# Patient Record
Sex: MALE | Race: WHITE | Employment: FULL TIME | URBAN - METROPOLITAN AREA
[De-identification: names, ages, dates, MRNs, and addresses within clinical notes are randomized per-mention and may not be internally consistent; named-entity substitution may affect disease eponyms.]

---

## 2021-08-20 ENCOUNTER — OFFICE VISIT (OUTPATIENT)
Dept: ORTHOPEDIC SURGERY | Age: 46
End: 2021-08-20
Payer: COMMERCIAL

## 2021-08-20 VITALS
DIASTOLIC BLOOD PRESSURE: 87 MMHG | BODY MASS INDEX: 30.54 KG/M2 | WEIGHT: 238 LBS | HEART RATE: 59 BPM | RESPIRATION RATE: 12 BRPM | SYSTOLIC BLOOD PRESSURE: 128 MMHG | HEIGHT: 74 IN

## 2021-08-20 DIAGNOSIS — M76.51 PATELLAR TENDINITIS OF RIGHT KNEE: ICD-10-CM

## 2021-08-20 DIAGNOSIS — M25.561 ACUTE PAIN OF RIGHT KNEE: Primary | ICD-10-CM

## 2021-08-20 PROCEDURE — 99203 OFFICE O/P NEW LOW 30 MIN: CPT | Performed by: ORTHOPAEDIC SURGERY

## 2021-08-20 RX ORDER — LISINOPRIL 10 MG/1
10 TABLET ORAL DAILY
COMMUNITY
Start: 2021-08-07

## 2021-08-20 NOTE — PROGRESS NOTES
Trent Gasca is seen today for evaluation of ongoing right knee pain. His pain started back in April. Pain occurred during a run. He has very intense at the inferior pole of the right patella. It is not getting any better. Bearing weight on the single right leg such as getting out of a car or ascending or descending stairs is particularly painful. He cannot kneel at all because of pain. Ibuprofen and ice are not helpful. Stretching has not been helpful. Feels weak. He can no longer run. He has high blood pressure but is otherwise quite healthy. 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 History: Patient's relevant past family, medical, and social history are reviewed as part of today's visit. ROS of pertinent positives and negatives as above; otherwise negative. General Exam:    Vitals: Blood pressure 128/87, pulse 59, resp. rate 12, height 6' 1.5\" (1.867 m), weight 238 lb (108 kg). Constitutional: Patient is adequately groomed with no evidence of malnutrition  Mental Status: The patient is oriented to time, place and person. The patient's mood and affect are appropriate. Gait:  Patient walks with normal gait and station. Lymphatic: The lymphatic examination bilaterally reveals all areas to be without enlargement or induration. Vascular: Examination reveals no swelling or calf tenderness. Peripheral pulses are palpable and 2+. Neurological: The patient has good coordination. There is no weakness or sensory deficit. Skin:    Head/Neck: inspection reveals no rashes, ulcerations or lesions. Trunk:  inspection reveals no rashes, ulcerations or lesions. Right Lower Extremity: inspection reveals no rashes, ulcerations or lesions. Left Lower Extremity: inspection reveals no rashes, ulcerations or lesions. Examination of the bilateral hips reveals normal flexion and extension. There is no restriction in rotation.   There is no tenderness to palpation anteriorly posteriorly or laterally. Left knee examination demonstrates no effusion. There is no tenderness to palpation over the medial or lateral joint line. There is no discomfort over the patellar tendon. There is no palpable popliteal cyst.  Sensation is intact. Range of motion is normal.  There is no patellofemoral crepitation. There is no instability to varus or valgus stress applied at 0, 30, 60, or 90Â° of flexion. There is no anterior or posterior drawer. Lachman examination is normal.      Right knee has intense discomfort at the inferior pole the patella along the patella tendon. He has pain with straight leg raise. He has a small effusion. He also has medial joint line tenderness. He has full range of motion with normal ligamentous stability. Calf is soft. X-rays were obtained today in the office and interpreted by me. AP standing, PA flexed, and merchant views of the bilateral knees as well as a lateral of the right knee. These demonstrate: Bony abnormalities. There is trace spurring at the inferior patella      Assessment: Concern for partial patella tendon rupture versus anterior horn medial meniscus tear    Plan: MRI scan right knee. Follow-up with me after the scan            This note was created using voice recognition software. It has been proofread, but occasionally errors remain. Please disregard these errors. They will be corrected as they are noted.

## 2021-08-24 ENCOUNTER — OFFICE VISIT (OUTPATIENT)
Dept: ORTHOPEDIC SURGERY | Age: 46
End: 2021-08-24
Payer: COMMERCIAL

## 2021-08-24 VITALS
HEART RATE: 52 BPM | HEIGHT: 74 IN | BODY MASS INDEX: 30.54 KG/M2 | DIASTOLIC BLOOD PRESSURE: 88 MMHG | WEIGHT: 238 LBS | SYSTOLIC BLOOD PRESSURE: 125 MMHG

## 2021-08-24 DIAGNOSIS — M76.51 PATELLAR TENDINITIS OF RIGHT KNEE: ICD-10-CM

## 2021-08-24 DIAGNOSIS — M25.561 ACUTE PAIN OF RIGHT KNEE: Primary | ICD-10-CM

## 2021-08-24 PROCEDURE — 99214 OFFICE O/P EST MOD 30 MIN: CPT | Performed by: ORTHOPAEDIC SURGERY

## 2021-08-24 NOTE — PROGRESS NOTES
Kesha Vazquez returns today to follow-up his right knee. Pain at rest is still 2 out of 10. We obtained his MRI. General Exam:    Vitals: Blood pressure 125/88, pulse 52, height 6' 1.5\" (1.867 m), weight 238 lb (108 kg). Constitutional: Patient is adequately groomed with no evidence of malnutrition  Mental Status: The patient is oriented to time, place and person. The patient's mood and affect are appropriate. Right knee has intense tenderness at the inferior pole the patella and pain with deep knee flexion. Right knee MRI is reviewed. It demonstrates:     FINDINGS:  Mild swelling of the medial parapatellar plica and swelling or blistering and broad    foci of class 3 of the medial patellar facet.  Central chondromalacia.  No patellofemoral    dysplasia.       The dominant finding consists of an inferior patellar tubercle spur with delamination of the    inferior prepatellar plate and focal hypertrophic coalescent tendinopathy compatible with overt    jumper's knee.  Distal patellar tendon normal.  Quadriceps tendon normal.       ACL normal.  PCL normal.       Collaterals normal.       Corners normal.       Flexors normal.       Popliteal fossa normal.  No soft tissue or skeletal neoplastic masses identified.       Menisci intact.       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  Reactive plical    thickening.  Overt moderate severity hypertrophic jumper's knee. I reviewed these findings on the report and the images with the patient. 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 Plan: We reviewed the risks, benefits, and alternatives to surgery. The alternatives include conservative management including medications, injections, and physical therapy as well as observation. Risks of surgery include but are not limited to persistent pain, instability, and reinjury.   Risks also include risk of infection which could result in

## 2021-10-20 ENCOUNTER — TELEPHONE (OUTPATIENT)
Dept: ORTHOPEDIC SURGERY | Age: 46
End: 2021-10-20

## 2021-10-20 NOTE — TELEPHONE ENCOUNTER
CPT: [id number]  BODY PART: right knee  AUTHORIZATION: NPR    Submitted online with [facility] Case ID# [id number] was submitted to Colby Everett on 10- by Zuleima Alcala    Case pending 10/25/21    Case pending 10/29/21    Per [facility], [facility] 11/1/21

## 2021-10-29 ENCOUNTER — OFFICE VISIT (OUTPATIENT)
Dept: ORTHOPEDIC SURGERY | Age: 46
End: 2021-10-29
Payer: COMMERCIAL

## 2021-10-29 VITALS
DIASTOLIC BLOOD PRESSURE: 88 MMHG | SYSTOLIC BLOOD PRESSURE: 122 MMHG | WEIGHT: 232 LBS | BODY MASS INDEX: 29.77 KG/M2 | HEART RATE: 58 BPM | HEIGHT: 74 IN

## 2021-10-29 DIAGNOSIS — M25.561 ACUTE PAIN OF RIGHT KNEE: Primary | ICD-10-CM

## 2021-10-29 DIAGNOSIS — M76.51 PATELLAR TENDINITIS OF RIGHT KNEE: ICD-10-CM

## 2021-10-29 PROCEDURE — 99214 OFFICE O/P EST MOD 30 MIN: CPT | Performed by: ORTHOPAEDIC SURGERY

## 2021-10-29 PROCEDURE — E0114 CRUTCH UNDERARM PAIR NO WOOD: HCPCS | Performed by: ORTHOPAEDIC SURGERY

## 2021-10-29 NOTE — PROGRESS NOTES
Jennifer Garvey returns today for his right knee. He still gets moderate pain. Is only 2 out of 10 at rest but stairs, climbing, and squatting are particularly troublesome. General Exam:    Vitals: Blood pressure 122/88, pulse 58, height 6' 1.5\" (1.867 m), weight 232 lb (105.2 kg). Constitutional: Patient is adequately groomed with no evidence of malnutrition  Mental Status: The patient is oriented to time, place and person. The patient's mood and affect are appropriate. Right knee today has tenderness to palpation over the anterior knee. Since proximal pole the patella. He has no crepitation or intra-articular effusion and no joint line pain. MRI right knee and x-rays of the right knee are again reviewed. MRI scan demonstrates:    FINDINGS:  Mild swelling of the medial parapatellar plica and swelling or blistering and broad    foci of class 3 of the medial patellar facet.  Central chondromalacia.  No patellofemoral    dysplasia.       The dominant finding consists of an inferior patellar tubercle spur with delamination of the    inferior prepatellar plate and focal hypertrophic coalescent tendinopathy compatible with overt    jumper's knee.  Distal patellar tendon normal.  Quadriceps tendon normal.       ACL normal.  PCL normal.       Collaterals normal.       Corners normal.       Flexors normal.       Popliteal fossa normal.  No soft tissue or skeletal neoplastic masses identified.       Menisci intact.       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  Reactive plical    thickening.  Overt moderate severity hypertrophic jumper's knee. His x-rays show spurring of the inferior patella. I reviewed these findings again with the patient and his wife. Assessment: Right knee patella tendinitis with inferior patellar spur. Plan: Right knee arthroscopy followed by open patella tendon debridement and resection of spur.     We reviewed the risks, benefits, and alternatives to surgery. The alternatives include conservative management including medications, injections, and physical therapy as well as observation. Risks of surgery include but are not limited to persistent pain, instability, and reinjury. Risks also include risk of infection which could result in the need for further surgery and long-term use of antibiotics. Risks also include deep venous thromboses and pulmonary emboli. Risks also include problems with anesthesia including but not limited to cardiovascular compromise , stroke,  and death. The patient understands that the goal of surgery is to improve pain and function but that can never be guaranteed. We discussed full recovery but in the order of about 3 months. All of his questions have been answered at length. He understands the risk of surgery. He will get preoperative clearance and have a negative Covid test prior to surgery. All questions have been answered. Medical decision is moderate.

## 2021-11-08 ENCOUNTER — TELEPHONE (OUTPATIENT)
Dept: ORTHOPEDIC SURGERY | Age: 46
End: 2021-11-08

## 2021-11-09 ENCOUNTER — ANESTHESIA EVENT (OUTPATIENT)
Dept: OPERATING ROOM | Age: 46
End: 2021-11-09
Payer: COMMERCIAL

## 2021-11-09 DIAGNOSIS — M76.51 PATELLAR TENDINITIS OF RIGHT KNEE: ICD-10-CM

## 2021-11-09 DIAGNOSIS — M25.561 ACUTE PAIN OF RIGHT KNEE: Primary | ICD-10-CM

## 2021-11-09 RX ORDER — DOCUSATE SODIUM 100 MG/1
100 CAPSULE, LIQUID FILLED ORAL 2 TIMES DAILY
Qty: 60 CAPSULE | Refills: 0 | Status: SHIPPED | OUTPATIENT
Start: 2021-11-10 | End: 2022-01-11

## 2021-11-09 RX ORDER — PROMETHAZINE HYDROCHLORIDE 25 MG/1
25 TABLET ORAL EVERY 6 HOURS PRN
Qty: 30 TABLET | Refills: 0 | Status: SHIPPED | OUTPATIENT
Start: 2021-11-10 | End: 2021-11-16

## 2021-11-09 RX ORDER — OXYCODONE HYDROCHLORIDE AND ACETAMINOPHEN 5; 325 MG/1; MG/1
1 TABLET ORAL EVERY 6 HOURS PRN
Qty: 20 TABLET | Refills: 0 | Status: SHIPPED | OUTPATIENT
Start: 2021-11-10 | End: 2021-11-15

## 2021-11-09 NOTE — PROGRESS NOTES
C-Difficile admission screening and protocol:     * Admitted with diarrhea? YES____    NO___X__     *Prior history of C-Diff. In last 3 months? YES____   NO__X___     *Antibiotic use in the past 6-8 weeks? NO___X___YES______                 If yes which  ANTIBIOTIC AND REASON______     *Prior hospitalization or nursing home in the last month?  YES____   NO_X__ Scribe Attestation (For Scribes USE Only)... Scribe Attestation (For Scribes USE Only).../I have personally evaluated and examined the patient. The Attending was available to me as a supervising provider if needed.

## 2021-11-09 NOTE — PROGRESS NOTES
[address] [facility] time____0920________        Surgery time___1120_________    Take the following medications with a sip of water: Follow your MD/Surgeons pre-procedure instructions regarding your medications    Do not eat or drink anything after 12:00 midnight prior to your surgery. This includes water chewing gum, mints and ice chips. You may brush your teeth and gargle the morning of your surgery, but do not swallow the water     Please see your family doctor/pediatrician for a history and physical and/or concerning medications. Bring any test results/reports from your physicians office. If you are under the care of a heart doctor or specialist doctor, please be aware that you may be asked to them for clearance    You may be asked to stop blood thinners such as Coumadin, Plavix, Fragmin, Lovenox, etc., or any anti-inflammatories such as:  Aspirin, Ibuprofen, Advil, Naproxen prior to your surgery. We also ask that you stop any OTC medications such as fish oil, vitamin E, glucosamine, garlic, Multivitamins, COQ 10, etc.    We ask that you do not smoke 24 hours prior to surgery  We ask that you do not  drink any alcoholic beverages 24 hours prior to surgery     You must make arrangements for a responsible adult to take you home after your surgery. For your safety you will not be allowed to leave alone or drive yourself home. Your surgery will be cancelled if you do not have a ride home. Also for your safety, it is strongly suggested that someone stay with you the first 24 hours after your surgery. A parent or legal guardian must accompany a child scheduled for surgery and plan to stay at the hospital until the child is discharged. Please do not bring other children with you. For your comfort, please wear simple loose fitting clothing to the hospital.  Please do not bring valuables.     Do not wear any make-up or nail polish on your fingers or toes      For your safety, please do not wear any jewelry or body piercing's on the day of surgery. All jewelry must be removed. If you have dentures, they will be removed before going to operating room. For your convenience, we will provide you with a container. If you wear contact lenses or glasses, they will be removed, please bring a case for them. If you have a living will and a durable power of  for healthcare, please bring in a copy. As part of our patient safety program to minimize surgical site infections, we ask you to do the following:    · Please notify your surgeon if you develop any illness between         now and the  day of your surgery. · This includes a cough, cold, fever, sore throat, nausea,         or vomiting, and diarrhea, etc.  ·  Please notify your surgeon if you experience dizziness, shortness         of breath or blurred vision between now and the time of your surgery. Do not shave your operative site 96 hours prior to surgery. For face and neck surgery, men may use an electric razor 48 hours   prior to surgery. You may shower the night before surgery or the morning of   your surgery with an antibacterial soap. You will need to bring a photo ID and insurance card    [facility] has an onsite pharmacy, would you like to utilize our pharmacy     If you will be staying overnight and use a C-pap machine, please bring   your C-pap to hospital     Our goal is to provide you with excellent care, therefore, visitors will be limited to two(2) in the room at a time so that we may focus on providing this care for you. Please contact pre-admission testing if you have any further questions. [facility] phone number:  [phone] PAT fax number:  [phone]  Please note these are generalized instructions for all surgical cases, you may be provided with more specific instructions according to your surgery.

## 2021-11-09 NOTE — PROGRESS NOTES
Phone message left to call PAT dept at [phone]  for history review and surgery instructions on 11/9/21 @ 4906 to both patient and emergency contact numbers

## 2021-11-10 ENCOUNTER — ANESTHESIA (OUTPATIENT)
Dept: OPERATING ROOM | Age: 46
End: 2021-11-10
Payer: COMMERCIAL

## 2021-11-10 ENCOUNTER — HOSPITAL ENCOUNTER (OUTPATIENT)
Age: 46
Setting detail: OUTPATIENT SURGERY
Discharge: HOME | End: 2021-11-10
Attending: ORTHOPAEDIC SURGERY | Admitting: ORTHOPAEDIC SURGERY
Payer: COMMERCIAL

## 2021-11-10 VITALS
TEMPERATURE: 97 F | DIASTOLIC BLOOD PRESSURE: 83 MMHG | BODY MASS INDEX: 28.82 KG/M2 | OXYGEN SATURATION: 98 % | HEART RATE: 62 BPM | HEIGHT: 74 IN | SYSTOLIC BLOOD PRESSURE: 135 MMHG | RESPIRATION RATE: 16 BRPM | WEIGHT: 224.54 LBS

## 2021-11-10 VITALS
DIASTOLIC BLOOD PRESSURE: 74 MMHG | RESPIRATION RATE: 4 BRPM | SYSTOLIC BLOOD PRESSURE: 159 MMHG | OXYGEN SATURATION: 95 % | TEMPERATURE: 97.2 F

## 2021-11-10 PROCEDURE — 6360000002 HC RX W HCPCS: Performed by: ANESTHESIOLOGY

## 2021-11-10 PROCEDURE — 7100000001 HC PACU RECOVERY - ADDTL 15 MIN: Performed by: ORTHOPAEDIC SURGERY

## 2021-11-10 PROCEDURE — 3700000001 HC ADD 15 MINUTES (ANESTHESIA): Performed by: ORTHOPAEDIC SURGERY

## 2021-11-10 PROCEDURE — 7100000000 HC PACU RECOVERY - FIRST 15 MIN: Performed by: ORTHOPAEDIC SURGERY

## 2021-11-10 PROCEDURE — 7100000011 HC PHASE II RECOVERY - ADDTL 15 MIN: Performed by: ORTHOPAEDIC SURGERY

## 2021-11-10 PROCEDURE — 6360000002 HC RX W HCPCS: Performed by: NURSE ANESTHETIST, CERTIFIED REGISTERED

## 2021-11-10 PROCEDURE — 3700000000 HC ANESTHESIA ATTENDED CARE: Performed by: ORTHOPAEDIC SURGERY

## 2021-11-10 PROCEDURE — 2500000003 HC RX 250 WO HCPCS: Performed by: NURSE ANESTHETIST, CERTIFIED REGISTERED

## 2021-11-10 PROCEDURE — 3600000014 HC SURGERY LEVEL 4 ADDTL 15MIN: Performed by: ORTHOPAEDIC SURGERY

## 2021-11-10 PROCEDURE — 2580000003 HC RX 258: Performed by: ORTHOPAEDIC SURGERY

## 2021-11-10 PROCEDURE — 3600000004 HC SURGERY LEVEL 4 BASE: Performed by: ORTHOPAEDIC SURGERY

## 2021-11-10 PROCEDURE — 2720000010 HC SURG SUPPLY STERILE: Performed by: ORTHOPAEDIC SURGERY

## 2021-11-10 PROCEDURE — L1830 KO IMMOB CANVAS LONG PRE OTS: HCPCS | Performed by: ORTHOPAEDIC SURGERY

## 2021-11-10 PROCEDURE — 64447 NJX AA&/STRD FEMORAL NRV IMG: CPT | Performed by: ANESTHESIOLOGY

## 2021-11-10 PROCEDURE — 2709999900 HC NON-CHARGEABLE SUPPLY: Performed by: ORTHOPAEDIC SURGERY

## 2021-11-10 PROCEDURE — 2500000003 HC RX 250 WO HCPCS: Performed by: ORTHOPAEDIC SURGERY

## 2021-11-10 PROCEDURE — 2580000003 HC RX 258: Performed by: ANESTHESIOLOGY

## 2021-11-10 PROCEDURE — 7100000010 HC PHASE II RECOVERY - FIRST 15 MIN: Performed by: ORTHOPAEDIC SURGERY

## 2021-11-10 PROCEDURE — 6360000002 HC RX W HCPCS: Performed by: ORTHOPAEDIC SURGERY

## 2021-11-10 RX ORDER — BUPIVACAINE HYDROCHLORIDE AND EPINEPHRINE 5; 5 MG/ML; UG/ML
INJECTION, SOLUTION EPIDURAL; INTRACAUDAL; PERINEURAL
Status: COMPLETED | OUTPATIENT
Start: 2021-11-10 | End: 2021-11-10

## 2021-11-10 RX ORDER — SUCCINYLCHOLINE/SOD CL,ISO/PF 200MG/10ML
SYRINGE (ML) INTRAVENOUS PRN
Status: DISCONTINUED | OUTPATIENT
Start: 2021-11-10 | End: 2021-11-10 | Stop reason: SDUPTHER

## 2021-11-10 RX ORDER — FENTANYL CITRATE 50 UG/ML
INJECTION, SOLUTION INTRAMUSCULAR; INTRAVENOUS PRN
Status: DISCONTINUED | OUTPATIENT
Start: 2021-11-10 | End: 2021-11-10 | Stop reason: SDUPTHER

## 2021-11-10 RX ORDER — LIDOCAINE HYDROCHLORIDE 20 MG/ML
INJECTION, SOLUTION EPIDURAL; INFILTRATION; INTRACAUDAL; PERINEURAL PRN
Status: DISCONTINUED | OUTPATIENT
Start: 2021-11-10 | End: 2021-11-10 | Stop reason: SDUPTHER

## 2021-11-10 RX ORDER — SODIUM CHLORIDE 9 MG/ML
25 INJECTION, SOLUTION INTRAVENOUS PRN
Status: DISCONTINUED | OUTPATIENT
Start: 2021-11-10 | End: 2021-11-10 | Stop reason: HOSPADM

## 2021-11-10 RX ORDER — DEXAMETHASONE SODIUM PHOSPHATE 4 MG/ML
INJECTION, SOLUTION INTRA-ARTICULAR; INTRALESIONAL; INTRAMUSCULAR; INTRAVENOUS; SOFT TISSUE PRN
Status: DISCONTINUED | OUTPATIENT
Start: 2021-11-10 | End: 2021-11-10 | Stop reason: SDUPTHER

## 2021-11-10 RX ORDER — MAGNESIUM HYDROXIDE 1200 MG/15ML
LIQUID ORAL CONTINUOUS PRN
Status: COMPLETED | OUTPATIENT
Start: 2021-11-10 | End: 2021-11-10

## 2021-11-10 RX ORDER — MIDAZOLAM HYDROCHLORIDE 1 MG/ML
INJECTION INTRAMUSCULAR; INTRAVENOUS PRN
Status: DISCONTINUED | OUTPATIENT
Start: 2021-11-10 | End: 2021-11-10 | Stop reason: SDUPTHER

## 2021-11-10 RX ORDER — ROCURONIUM BROMIDE 10 MG/ML
INJECTION, SOLUTION INTRAVENOUS PRN
Status: DISCONTINUED | OUTPATIENT
Start: 2021-11-10 | End: 2021-11-10 | Stop reason: SDUPTHER

## 2021-11-10 RX ORDER — KETAMINE HCL IN NACL, ISO-OSM 100MG/10ML
SYRINGE (ML) INJECTION PRN
Status: DISCONTINUED | OUTPATIENT
Start: 2021-11-10 | End: 2021-11-10 | Stop reason: SDUPTHER

## 2021-11-10 RX ORDER — SODIUM CHLORIDE 0.9 % (FLUSH) 0.9 %
5-40 SYRINGE (ML) INJECTION EVERY 12 HOURS SCHEDULED
Status: DISCONTINUED | OUTPATIENT
Start: 2021-11-10 | End: 2021-11-10 | Stop reason: HOSPADM

## 2021-11-10 RX ORDER — ONDANSETRON 2 MG/ML
INJECTION INTRAMUSCULAR; INTRAVENOUS PRN
Status: DISCONTINUED | OUTPATIENT
Start: 2021-11-10 | End: 2021-11-10 | Stop reason: SDUPTHER

## 2021-11-10 RX ORDER — SODIUM CHLORIDE 0.9 % (FLUSH) 0.9 %
5-40 SYRINGE (ML) INJECTION PRN
Status: DISCONTINUED | OUTPATIENT
Start: 2021-11-10 | End: 2021-11-10 | Stop reason: HOSPADM

## 2021-11-10 RX ORDER — PROPOFOL 10 MG/ML
INJECTION, EMULSION INTRAVENOUS PRN
Status: DISCONTINUED | OUTPATIENT
Start: 2021-11-10 | End: 2021-11-10 | Stop reason: SDUPTHER

## 2021-11-10 RX ORDER — GLYCOPYRROLATE 0.2 MG/ML
INJECTION INTRAMUSCULAR; INTRAVENOUS PRN
Status: DISCONTINUED | OUTPATIENT
Start: 2021-11-10 | End: 2021-11-10 | Stop reason: SDUPTHER

## 2021-11-10 RX ORDER — SODIUM CHLORIDE, SODIUM LACTATE, POTASSIUM CHLORIDE, CALCIUM CHLORIDE 600; 310; 30; 20 MG/100ML; MG/100ML; MG/100ML; MG/100ML
INJECTION, SOLUTION INTRAVENOUS CONTINUOUS PRN
Status: COMPLETED | OUTPATIENT
Start: 2021-11-10 | End: 2021-11-10

## 2021-11-10 RX ORDER — SODIUM CHLORIDE 9 MG/ML
INJECTION, SOLUTION INTRAVENOUS CONTINUOUS
Status: DISCONTINUED | OUTPATIENT
Start: 2021-11-10 | End: 2021-11-10 | Stop reason: HOSPADM

## 2021-11-10 RX ORDER — ROPIVACAINE HYDROCHLORIDE 5 MG/ML
INJECTION, SOLUTION EPIDURAL; INFILTRATION; PERINEURAL
Status: COMPLETED | OUTPATIENT
Start: 2021-11-10 | End: 2021-11-10

## 2021-11-10 RX ADMIN — GLYCOPYRROLATE 0.2 MG: 0.2 INJECTION, SOLUTION INTRAMUSCULAR; INTRAVENOUS at 12:50

## 2021-11-10 RX ADMIN — Medication 20 MG: at 12:01

## 2021-11-10 RX ADMIN — PROPOFOL 200 MG: 10 INJECTION, EMULSION INTRAVENOUS at 12:01

## 2021-11-10 RX ADMIN — FENTANYL CITRATE 100 MCG: 50 INJECTION INTRAMUSCULAR; INTRAVENOUS at 11:30

## 2021-11-10 RX ADMIN — ROCURONIUM BROMIDE 10 MG: 10 INJECTION INTRAVENOUS at 12:01

## 2021-11-10 RX ADMIN — DEXAMETHASONE SODIUM PHOSPHATE 8 MG: 4 INJECTION, SOLUTION INTRAMUSCULAR; INTRAVENOUS at 12:06

## 2021-11-10 RX ADMIN — ROPIVACAINE HYDROCHLORIDE 30 ML: 5 INJECTION, SOLUTION EPIDURAL; INFILTRATION; PERINEURAL at 11:55

## 2021-11-10 RX ADMIN — CEFAZOLIN SODIUM 2000 MG: 10 INJECTION, POWDER, FOR SOLUTION INTRAVENOUS at 12:09

## 2021-11-10 RX ADMIN — SUGAMMADEX 200 MG: 100 INJECTION, SOLUTION INTRAVENOUS at 12:47

## 2021-11-10 RX ADMIN — LIDOCAINE HYDROCHLORIDE 100 MG: 20 INJECTION, SOLUTION EPIDURAL; INFILTRATION; INTRACAUDAL; PERINEURAL at 12:01

## 2021-11-10 RX ADMIN — ONDANSETRON 4 MG: 2 INJECTION INTRAMUSCULAR; INTRAVENOUS at 12:06

## 2021-11-10 RX ADMIN — Medication 160 MG: at 12:02

## 2021-11-10 RX ADMIN — MIDAZOLAM 2 MG: 1 INJECTION INTRAMUSCULAR; INTRAVENOUS at 11:30

## 2021-11-10 RX ADMIN — Medication 10 MG: at 12:47

## 2021-11-10 RX ADMIN — SODIUM CHLORIDE: 9 INJECTION, SOLUTION INTRAVENOUS at 10:20

## 2021-11-10 ASSESSMENT — PULMONARY FUNCTION TESTS
PIF_VALUE: 31
PIF_VALUE: 11
PIF_VALUE: 19
PIF_VALUE: 17
PIF_VALUE: 19
PIF_VALUE: 19
PIF_VALUE: 18
PIF_VALUE: 15
PIF_VALUE: 0
PIF_VALUE: 17
PIF_VALUE: 2
PIF_VALUE: 15
PIF_VALUE: 18
PIF_VALUE: 19
PIF_VALUE: 19
PIF_VALUE: 17
PIF_VALUE: 18
PIF_VALUE: 14
PIF_VALUE: 15
PIF_VALUE: 18
PIF_VALUE: 19
PIF_VALUE: 18
PIF_VALUE: 22
PIF_VALUE: 20
PIF_VALUE: 18
PIF_VALUE: 18
PIF_VALUE: 17
PIF_VALUE: 19
PIF_VALUE: 0
PIF_VALUE: 1
PIF_VALUE: 19
PIF_VALUE: 13
PIF_VALUE: 18
PIF_VALUE: 19
PIF_VALUE: 15
PIF_VALUE: 5
PIF_VALUE: 19
PIF_VALUE: 18
PIF_VALUE: 0
PIF_VALUE: 19
PIF_VALUE: 15
PIF_VALUE: 19
PIF_VALUE: 17
PIF_VALUE: 18
PIF_VALUE: 19
PIF_VALUE: 29
PIF_VALUE: 17
PIF_VALUE: 14
PIF_VALUE: 18
PIF_VALUE: 1
PIF_VALUE: 19
PIF_VALUE: 18
PIF_VALUE: 19
PIF_VALUE: 15
PIF_VALUE: 19
PIF_VALUE: 18
PIF_VALUE: 22
PIF_VALUE: 19
PIF_VALUE: 0
PIF_VALUE: 14
PIF_VALUE: 2
PIF_VALUE: 17
PIF_VALUE: 6

## 2021-11-10 ASSESSMENT — PAIN SCALES - GENERAL
PAINLEVEL_OUTOF10: 0
PAINLEVEL_OUTOF10: 2
PAINLEVEL_OUTOF10: 0
PAINLEVEL_OUTOF10: 0

## 2021-11-10 ASSESSMENT — LIFESTYLE VARIABLES: SMOKING_STATUS: 0

## 2021-11-10 ASSESSMENT — PAIN - FUNCTIONAL ASSESSMENT: PAIN_FUNCTIONAL_ASSESSMENT: 0-10

## 2021-11-10 NOTE — H&P
Kimberly Ambriz was seen and examined. No interval changes. All questions have been answered. Risk, benefits, and alternatives to surgery have been discussed at length and patient is ready to proceed with right knee arthroscopy with open patella tendon debridement.

## 2021-11-10 NOTE — ANESTHESIA PROCEDURE NOTES
Peripheral Block    Patient location during procedure: PACU  Staffing  Performed: anesthesiologist   Anesthesiologist: Jose Jones MD  Preanesthetic Checklist  Completed: patient identified, IV checked, site marked, risks and benefits discussed, surgical consent, monitors and equipment checked, pre-op evaluation, timeout performed, anesthesia consent given, oxygen available and patient being monitored  Peripheral Block  Patient position: supine  Prep: ChloraPrep  Patient monitoring: cardiac monitor, continuous pulse ox, frequent blood pressure checks and IV access  Block type: Femoral  Laterality: right  Injection technique: single-shot  Guidance: ultrasound guided  Adductor canal  Provider prep: mask and sterile gloves  Needle  Needle type: combined needle/nerve stimulator   Needle gauge: 22 G  Needle length: 5 cm  Needle localization: ultrasound guidance  Assessment  Injection assessment: negative aspiration for heme, no paresthesia on injection and local visualized surrounding nerve on ultrasound  Slow fractionated injection: yes  Hemodynamics: stable  Additional Notes  Sartorius and Vastus Medialis Muscle, Femoral artery and Saphenous nerve are identified; the tip of the needle and the spread of the local anesthetic around the Saphenous nerve are visualized. The Saphenous nerve appeared to be anatomically normal and there were no abnormal pathologically findings seen.    Medications Administered  Ropivacaine (NAROPIN) injection 0.5%, 30 mL  Reason for block: post-op pain management

## 2021-11-10 NOTE — ANESTHESIA PRE PROCEDURE
[facility] Department of Anesthesiology  Pre-Anesthesia Evaluation/Consultation       Name:  Tish Forrester  : 1975  Age:  42 y. o. MRN:  [id number]  Date: 11/10/2021           Surgeon: Surgeon(s):  Ezra Brown MD    Procedure: Procedure(s):  RIGHT KNEE ARTHROSCOPY, OPEN PATELLA TENDON DEBRIDEMENT     No Known Allergies  There is no problem list on file for this patient. Past Medical History:   Diagnosis Date    Hypertension      Past Surgical History:   Procedure Laterality Date    LIPOSUCTION       Social History     Tobacco Use    Smoking status: Never Smoker    Smokeless tobacco: Never Used   Vaping Use    Vaping Use: Never used   Substance Use Topics    Alcohol use: Yes     Comment: socially    Drug use: Never     Medications  No current facility-administered medications on file prior to encounter.      Current Outpatient Medications on File Prior to Encounter   Medication Sig Dispense Refill    sertraline (ZOLOFT) 50 MG tablet Take 100 mg by mouth daily       lisinopril (PRINIVIL;ZESTRIL) 10 MG tablet Take 10 mg by mouth daily        Current Facility-Administered Medications   Medication Dose Route Frequency Provider Last Rate Last Admin    0.9 % sodium chloride infusion   IntraVENous Continuous Yenifer Frost MD        sodium chloride flush 0.9 % injection 5-40 mL  5-40 mL IntraVENous 2 times per day Yenifer Frost MD        sodium chloride flush 0.9 % injection 5-40 mL  5-40 mL IntraVENous PRN Yenifer Frost MD        0.9 % sodium chloride infusion  25 mL IntraVENous PRN Yenifer Frost MD        ceFAZolin (ANCEF) 2000 mg in dextrose 5 % 100 mL IVPB  2,000 mg IntraVENous Once Ezra Brown MD         Vital Signs (Current)   Vitals:    21 1231 11/10/21 0945   BP:  (!) 144/86   Pulse:  56   Resp:  16   Temp:  96.9 °F (36.1 °C)   TempSrc:  Temporal   SpO2:  99%   Weight: 232 lb (105.2 kg) 224 lb 8.6 oz (101.9 kg)   Height: 6' 1.5\" (1.867 m) 6' 1.5\" (1.867 m)                                            Vital Signs Statistics (for past 48 hrs)     Temp  Av.9 °F (36.1 °C)  Min: 96.9 °F (36.1 °C)   Min taken time: 11/10/21 0945  Max: 96.9 °F (36.1 °C)   Max taken time: 11/10/21 0945  Pulse  Av  Min: 64   Min taken time: 11/10/21 0945  Max: 64   Max taken time: 11/10/21 0945  Resp  Av  Min: 16   Min taken time: 11/10/21 0945  Max: 12   Max taken time: 11/10/21 0945  BP  Min: 144/86   Min taken time: 11/10/21 0945  Max: 144/86   Max taken time: 11/10/21 0945  SpO2  Av %  Min: 99 %   Min taken time: 11/10/21 0945  Max: 99 %   Max taken time: 11/10/21 0945  BP Readings from Last 3 Encounters:   11/10/21 (!) 144/86   10/29/21 122/88   21 125/88       BMI  Body mass index is 29.22 kg/m². Estimated body mass index is 29.22 kg/m² as calculated from the following:    Height as of this encounter: 6' 1.5\" (1.867 m). Weight as of this encounter: 224 lb 8.6 oz (101.9 kg). CBC No results found for: WBC, RBC, HGB, HCT, MCV, RDW, PLT  CMP  No results found for: NA, K, CL, CO2, BUN, CREATININE, GFRAA, AGRATIO, LABGLOM, GLUCOSE, PROT, CALCIUM, BILITOT, ALKPHOS, AST, ALT  BMP  No results found for: NA, K, CL, CO2, BUN, CREATININE, CALCIUM, GFRAA, LABGLOM, GLUCOSE  POCGlucose  No results for input(s): GLUCOSE in the last 72 hours.    Coags  No results found for: PROTIME, INR, APTT  HCG (If Applicable) No results found for: PREGTESTUR, PREGSERUM, HCG, HCGQUANT   ABGs No results found for: PHART, PO2ART, OXB6WXA, UAY1TLG, BEART, Y1QAWXEV   Type & Screen (If Applicable)  No results found for: LABABO, LABRH                         BMI: Wt Readings from Last 3 Encounters:       NPO Status:   Date of last liquid consumption: 21   Time of last liquid consumption: 233   Date of last solid food consumption: 21      Time of last solid consumption: 2300       Anesthesia Evaluation  Patient summary reviewed no history of anesthetic complications:   Airway: Mallampati: II  TM distance: >3 FB   Neck ROM: full  Mouth opening: > = 3 FB Dental: normal exam         Pulmonary: breath sounds clear to auscultation      (-) COPD, asthma, sleep apnea and not a current smoker                           Cardiovascular:    (+) hypertension:,     (-) CABG/stent,  angina and no hyperlipidemia        Rate: normal                    Neuro/Psych:      (-) seizures, TIA and CVA           GI/Hepatic/Renal:        (-) GERD, liver disease and no renal disease       Endo/Other:        (-) diabetes mellitus, hypothyroidism               Abdominal:             Vascular:     - DVT and PE. Other Findings:             Anesthesia Plan      general and regional     ASA 2       Induction: intravenous. MIPS: Postoperative opioids intended and Prophylactic antiemetics administered. Anesthetic plan and risks discussed with patient. Plan discussed with CRNA. This pre-anesthesia assessment may be used as a history and physical.    DOS STAFF ADDENDUM:    Pt seen and examined, chart reviewed (including anesthesia, drug and allergy history). No interval changes to history and physical examination. Anesthetic plan, risks, benefits, alternatives, and personnel involved discussed with patient. Patient verbalized an understanding and agrees to proceed.       Wero Watkins MD  November 10, 2021  9:53 AM

## 2021-11-10 NOTE — DISCHARGE INSTR - DIET
Good nutrition is important when healing from an illness, injury, or surgery. Follow any nutrition recommendations given to you during your hospital stay. If you were given an oral nutrition supplement while in the hospital, continue to take this supplement at home. You can take it with meals, in-between meals, and/or before bedtime. These supplements can be purchased at most local grocery stores, pharmacies, and chain [employer]-stores. If you have any questions about your diet or nutrition, call the hospital and ask for the dietitian. Advance to regular diet as tolerated.

## 2021-11-10 NOTE — ANESTHESIA POSTPROCEDURE EVALUATION
Department of Anesthesiology  Postprocedure Note    Patient: Yosvany Osullivan  MRN: [id number]  YOB: 1975  Date of evaluation: 11/10/2021  Time:  3:44 PM     Procedure Summary     Date: 11/10/21 Room / Location: [address] [place] [address]    Anesthesia Start: 6421 Anesthesia Stop: 7775    Procedure: RIGHT KNEE ARTHROSCOPY, OPEN PATELLA TENDON DEBRIDEMENT, removal of plica (Right ) Diagnosis:       Patellar tendinitis of right knee      (RIGHT KNEE PATELLAR TENDINITIS)    Surgeons: Krish Fair MD Responsible Provider: Xu Bell MD    Anesthesia Type: general, regional ASA Status: 2          Anesthesia Type: general, regional    Jung Phase I: Jung Score: 10    Jung Phase II: Jung Score: 10    Last vitals: Reviewed and per EMR flowsheets.        Anesthesia Post Evaluation    Patient location during evaluation: PACU  Patient participation: complete - patient participated  Level of consciousness: awake and alert  Pain score: 3  Airway patency: patent  Nausea & Vomiting: no nausea and no vomiting  Complications: no  Cardiovascular status: blood pressure returned to baseline  Respiratory status: acceptable  Hydration status: euvolemic

## 2021-11-10 NOTE — BRIEF OP NOTE
Brief Postoperative Note      Patient: Jennifer Garvey  YOB: 1975  MRN: [id number]    Date of Procedure: 11/10/2021    Pre-Op Diagnosis: RIGHT KNEE PATELLAR TENDINITIS    Post-Op Diagnosis: Same, plica       Procedure(s):  RIGHT KNEE ARTHROSCOPY, OPEN PATELLA TENDON DEBRIDEMENT, removal of plica    Surgeon(s):  Pasquale Cabrrea MD    Assistant:  Surgical Assistant: Amandeep Rubio    Anesthesia: General    Estimated Blood Loss (mL): Minimal    Complications: None    Specimens:   * No specimens in log *    Implants:  * No implants in log *      Drains: * No LDAs found *    Findings: patella spur, tendinitis, plica    Electronically signed by Isaiah Rivers MD on 11/10/2021 at 1:05 PM

## 2021-11-11 ENCOUNTER — OFFICE VISIT (OUTPATIENT)
Dept: ORTHOPEDIC SURGERY | Age: 46
End: 2021-11-11
Payer: COMMERCIAL

## 2021-11-11 ENCOUNTER — HOSPITAL ENCOUNTER (OUTPATIENT)
Dept: PHYSICAL THERAPY | Age: 46
Setting detail: THERAPIES SERIES
Discharge: HOME | End: 2021-11-11
Payer: COMMERCIAL

## 2021-11-11 DIAGNOSIS — M76.51 PATELLAR TENDINITIS OF RIGHT KNEE: Primary | ICD-10-CM

## 2021-11-11 DIAGNOSIS — M25.561 ACUTE PAIN OF RIGHT KNEE: ICD-10-CM

## 2021-11-11 PROCEDURE — 97016 VASOPNEUMATIC DEVICE THERAPY: CPT

## 2021-11-11 PROCEDURE — 97110 THERAPEUTIC EXERCISES: CPT

## 2021-11-11 PROCEDURE — 99024 POSTOP FOLLOW-UP VISIT: CPT | Performed by: ORTHOPAEDIC SURGERY

## 2021-11-11 PROCEDURE — 20610 DRAIN/INJ JOINT/BURSA W/O US: CPT | Performed by: ORTHOPAEDIC SURGERY

## 2021-11-11 PROCEDURE — 97530 THERAPEUTIC ACTIVITIES: CPT

## 2021-11-11 PROCEDURE — 97161 PT EVAL LOW COMPLEX 20 MIN: CPT

## 2021-11-11 RX ORDER — LIDOCAINE HYDROCHLORIDE 10 MG/ML
3 INJECTION, SOLUTION INFILTRATION; PERINEURAL ONCE
Status: COMPLETED | OUTPATIENT
Start: 2021-11-11 | End: 2021-11-11

## 2021-11-11 RX ADMIN — LIDOCAINE HYDROCHLORIDE 3 ML: 10 INJECTION, SOLUTION INFILTRATION; PERINEURAL at 16:05

## 2021-11-11 NOTE — OP NOTE
[address] [facility]                                OPERATIVE REPORT    PATIENT NAME: Steven Regalado                        :        1975  MED REC NO:   [id number]                          ROOM:  ACCOUNT NO:   [de-identified]                           ADMIT DATE: 11/10/2021  PROVIDER:     Yoshi Spain MD      DATE OF PROCEDURE:  11/10/2021    PREOPERATIVE DIAGNOSIS:  Right knee patellar tendinitis. POSTOPERATIVE DIAGNOSES:  Right knee plica and right knee patellar  tendinitis. OPERATION PERFORMED:  Arthroscopy of the right knee with removal of  plica and open patellar tendon debridement. SURGEON:  Yoshi Spain MD    FIRST ASSISTANT:  Dr. Gay Hutchins. ANESTHESIA:  General.    ANTIBIOTICS:  Ancef. ESTIMATED BLOOD LOSS:  Minimal.    TOURNIQUET TIME:  0.    INDICATIONS:  The patient has had refractory right anterior knee pain at  the inferior pole of the patella. His MRI scan and plain x-ray showed  an osteophyte pinching on the patella causing significant tendinosis. He was indicated for surgery. Understanding the risks, benefits, and  alternatives of the operation, he was eager to proceed. OPERATIVE PROCEDURE:  The patient was identified in the preop holding  area. He received preop adductor canal block. He was brought to the  operating room, placed under general anesthesia. Examination of the  right knee under anesthesia revealed stable complete motion. The foot  of the bed was dropped away. The left leg was placed in a well-leg  camara. Bed was reflexed to prevent excessive lumbar lordosis. After  sterile prep and drape and time-out, arthroscopy was commenced with the  scope in the anterolateral portal.  Anteromedial working portal was  created from outside-in under needle guidance, findings as below;  1.  Grade 2 and 3 change in the patella. 2.  Grade 2 and 3 change in the trochlea.   3.

## 2021-11-11 NOTE — PROGRESS NOTES
Miguel Tierney returns today 1 day status post right knee arthroscopy with removal of plica and open patella tendon debridement. He is doing well. Pain is well managed. Today, incisions look good. He does have an effusion so I will aspirate that. Procedure: Under sterile technique, the right knee was anesthetized in the suprapatellar pouch. An aspirate the right knee of about 40 cc of blood. It decompressed nicely. Changes bandages in place new Steri-Strips and Tegaderm.   Follow-up with me in a week for suture removal.

## 2021-11-11 NOTE — PLAN OF CARE
[place] [facility], [address] [place], [address]     Phone: [phone]   Fax: [phone]                                                           Physical Therapy Certification    Dear Referring Practitioner: Cathie Nettles,    We had the pleasure of evaluating the following patient for physical therapy services at [address] [place] [address]. A summary of our findings can be found in the initial assessment below. This includes our plan of care. If you have any questions or concerns regarding these findings, please do not hesitate to contact me at the office phone number checked above. Thank you for the referral.       Physician Signature:_______________________________Date:__________________  By signing above (or electronic signature), therapists plan is approved by physician      Patient: Brigette Padron   : 1975   MRN: [id number]  Referring Physician: Referring Practitioner: Oneil      Evaluation Date: 2021      Medical Diagnosis Information:  Diagnosis: M76.51 (ICD-10-CM) - Patellar tendinitis of right knee; sp plica removal and patellar debridement 11/10   Treatment Diagnosis: M25.561 R knee pain                                           Precautions/ Contra-indications: 0-90 ROM R knee for 2 weeks then can progress to full   Latex Allergy:  [x]NO      []YES  Preferred Language for Healthcare:   [x]English       []Other:    C-SSRS Triggered by Intake questionnaire (Past 2 wk assessment):   [x] No, Questionnaire did not trigger screening.   [] Yes, Patient intake triggered further evaluation      [] C-SSRS Screening completed  [] PCP notified via Plan of Care  [] Emergency services notified       SUBJECTIVE:   Per MD note : Brigette Padron is seen today for evaluation of ongoing right knee pain. His pain started back in April. Pain occurred during a run. He has very intense at the inferior pole of the right patella.   It is not getting any better. Bearing weight on the single right leg such as getting out of a car or ascending or descending stairs is particularly painful. He cannot kneel at all because of pain. Ibuprofen and ice are not helpful. Stretching has not been helpful. Feels weak. He can no longer run. He has high blood pressure but is otherwise quite healthy. 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  Reactive plical    thickening.  Overt moderate severity hypertrophic jumper's knee. Today: Pt is 1 day s/p from plica removal and patellar tendon debridement. He notes mild pain today, slept okay last night. He last took percocet at 6am this morning but doesn't feel like he needs it again. He would like to be able to walk without pain and return to golfing. Relevant Medical History:HTN  Functional Disability Index: LEFS    Pain Scale: 3-4/10  Easing factors: rest, ice,   Provocative factors: walking, stairs, kneeling      Type: []Constant   []Intermittent  []Radiating []Localized []other:     Numbness/Tingling: pt denies     Functional Limitations/Impairments: []Sitting []Standing []Walking    []Squatting/bending  []Stairs           []ADL's  []Transfers []Sports/Recreation []Other:    Occupation/School: Manager of ticketing @ [employer]      Living Status/Prior Level of Function: Independent with ADLs and IADLs, golfing      OBJECTIVE:     Joint mobility:    []Normal    [x]Hypo: postop stiffness and edema    []Hyper    Palpation: intact to light touch     Functional Mobility/Transfers: wfl    Posture:     Bandages/Dressings/Incisions:  Bandages were removed and steri-strips were inspected. Leg was cleaned with rubbing alcohol and compressive wraps were placed on involved limb.      Gait: (include devices/WB status) enters clinic with bilateral crutches and immobilizer       Effusion  RIGHT LEFT   10cm superior to patella     Midline patella     10cm inferior to patella           Flexibility L   R   Comment     Quadricep           Hamstring            Gastroc            IT Band                                                 ROM   PROM   AROM   Overpressure   Comment        L   R   L   R   L   R        Flexion                        Extension                                                                                      Lauro Clinical Decision Rule for DVT     Date: 8/12/16  Clinical Presentation   Possible Score   Clients Score     Active cancer (within 6 months of diagnosis or receiving palliative care)   1   0     Paralysis, paresis, or recent immobilization of lower extremity   1   0     Bedridden for more than 3 days or major surgery in the last 4 weeks   1   1     Localized tenderness in the center of the posterior calf, the popliteal space, or along the femoral vein in the anterior thigh/groin   1   0    Entire lower extremity swelling   1   0    Unilateral calf swelling (more than 3 cm larger than uninvolved side)   1   0     Unilateral pitting edema   1   0     Collateral superficial veins (nonvaricose)   1   0     An alternative diagnosis is as likely (or more likely) than DVT (e.g., cellulitis, postoperative swelling, calf strain)   -2   -2     Total Points     -1       Key  · -2 to 0 Low probability of DVT 3% (95% confidence interval [CI] 1.7%-5.9%)  · 1 to 2 Moderate probability of DVT 17% (95% confidence interval [CI] 12%-23%)  · 3 or more High probability of DVT 75% (95% confidence interval [CI] 63%-84%)     Medical consultation is advised in the presence of low probability  Medical referral is required with moderate or high score. Lauro FARMER, Quentin DR, Rachele J, et al: Value of assessment of pretest probability of deep-vein thrombosis in clinical management, Lancet 281:4339-9335, 1997.     Orthopedic Special Tests: nt dt postop status                            [x] Patient history, allergies, meds reviewed. Medical chart reviewed. See intake form. Review Of Systems (ROS):  [x]Performed Review of systems (Integumentary, CardioPulmonary, Neurological) by intake and observation. Intake form has been scanned into medical record. Patient has been instructed to contact their primary care physician regarding ROS issues if not already being addressed at this time. Co-morbidities/Complexities (which will affect course of rehabilitation):    []None           Arthritic conditions   []Rheumatoid arthritis (M05.9)  []Osteoarthritis (M19.91)   Cardiovascular conditions   [x]Hypertension (I10)  []Hyperlipidemia (E78.5)  []Angina pectoris (I20)  []Atherosclerosis (I70)   Musculoskeletal conditions   []Disc pathology   []Congenital spine pathologies   []Prior surgical intervention  []Osteoporosis (M81.8)  []Osteopenia (M85.8)   Endocrine conditions   []Hypothyroid (E03.9)  []Hyperthyroid Gastrointestinal conditions   []Constipation (S00.92)   Metabolic conditions   []Morbid obesity (E66.01)  []Diabetes type 1(E10.65) or 2 (E11.65)   []Neuropathy (G60.9)     Pulmonary conditions   []Asthma (J45)  []Coughing   []COPD (J44.9)   Psychological Disorders  []Anxiety (F41.9)  []Depression (F32.9)   []Other:   []Other:          Barriers to/and or personal factors that will affect rehab potential:              []Age  []Sex              []Motivation/Lack of Motivation                        []Co-Morbidities              []Cognitive Function, education/learning barriers              []Environmental, home barriers              []profession/work barriers  []past PT/medical experience  []other:      Falls Risk Assessment (30 days):   [x] Falls Risk assessed and no intervention required.   [] Falls Risk assessed and Patient requires intervention due to being higher risk   TUG score (>12s at risk):     [] Falls education provided, including           Functional Assessment:   Functional Assessment scale used: LEFS Score: 88%    ASSESSMENT:   Functional Impairments:     []Noted lumbar/proximal hip/LE joint hypomobility   [x]Decreased LE functional ROM   [x]Decreased core/proximal hip strength and neuromuscular control   [x]Decreased LE functional strength   [x]Reduced balance/proprioceptive control   []other:      Functional Activity Limitations (from functional questionnaire and intake)   [x]Reduced ability to tolerate prolonged functional positions   [x]Reduced ability or difficulty with changes of positions or transfers between positions   []Reduced ability to maintain good posture and demonstrate good body mechanics with sitting, bending, and lifting   []Reduced ability to sleep   [x] Reduced ability or tolerance with driving and/or computer work   []Reduced ability to perform lifting, carrying tasks   [x]Reduced ability to squat   []Reduced ability to forward bend   [x]Reduced ability to ambulate prolonged functional periods/distances/surfaces   [x]Reduced ability to ascend/descend stairs   [x]Reduced ability to run, hop, cut or jump   []other:    Participation Restrictions   [x]Reduced participation in self care activities   [x]Reduced participation in home management activities   []Reduced participation in work activities   [x]Reduced participation in social activities. [x]Reduced participation in sport/recreation activities. Classification :    [x]Signs/symptoms consistent with post-surgical status including decreased ROM, strength and function.    []Signs/symptoms consistent with joint sprain/strain   []Signs/symptoms consistent with patella-femoral syndrome   []Signs/symptoms consistent with knee OA/hip OA   []Signs/symptoms consistent with internal derangement of knee/Hip   []Signs/symptoms consistent with functional hip weakness/NMR control      []Signs/symptoms consistent with tendinitis/tendinosis    []signs/symptoms consistent with pathology which may benefit from Dry needling      []other: Prognosis/Rehab Potential:      []Excellent   [x]Good    []Fair   []Poor    Tolerance of evaluation/treatment:    []Excellent   [x]Good    []Fair   []Poor    PLAN  Frequency/Duration:  1-2 days per week for 6-8 Weeks:  Interventions:  [x]  Therapeutic exercise including: strength training, ROM, for Lower extremity and core   [x]  NMR activation and proprioception for LE, Glutes and Core   [x]  Manual therapy as indicated for LE, Hip and spine to include: Dry Needling/IASTM, STM, PROM, Gr I-IV mobilizations, manipulation. [x] Modalities as needed that may include: thermal agents, E-stim, Biofeedback, US, iontophoresis as indicated  [x] Patient education on joint protection, postural re-education, activity modification, progression of HEP. HEP instruction:   Access Code: [email or web address]  URL: [email or web address]  Date: 11/11/2021  Prepared by: Sheri Sanches    Exercises  Seated Table Hamstring Stretch - 1 x daily - 7 x weekly - 1 sets - 5 reps - 30 hold  Long Sitting Calf Stretch with Strap - 1 x daily - 7 x weekly - 1 sets - 5 reps - 30 hold  Long Sitting Quad Set - 1 x daily - 7 x weekly - 1 sets - 10 reps - 10 hold  Straight Leg Raise - 1 x daily - 7 x weekly - 3 sets - 10 reps  Sidelying Hip Abduction - 1 x daily - 7 x weekly - 3 sets - 10 reps  Sitting Heel Slide with Towel - 1 x daily - 7 x weekly - 1 sets - 10 reps - 10 hold    GOALS:  Patient stated goal: return to walking without pain, return to golfing   [] Progressing: [] Met: [] Not Met: [] Adjusted    Therapist goals for Patient:   Short Term Goals: To be achieved in: 2 weeks  1. Independent in HEP and progression per patient tolerance, in order to prevent re-injury. [] Progressing: [] Met: [] Not Met: [] Adjusted   2. Patient will have a decrease in pain to facilitate improvement in movement, function, and ADLs as indicated by Functional Deficits. [] Progressing: [] Met: [] Not Met: [] Adjusted    Long Term Goals:  To be achieved in: 6-8 weeks  1. Disability index score of 20% or less for the LEFS to assist with reaching prior level of function. [] Progressing: [] Met: [] Not Met: [] Adjusted  2. Patient will demonstrate increased AROM to 120+ flexion, 0+ extension  to allow for proper joint functioning as indicated by patients Functional Deficits. [] Progressing: [] Met: [] Not Met: [] Adjusted  3. Patient will demonstrate an increase in Strength to 4/5 or greater  to allow for proper functional mobility as indicated by patients Functional Deficits. [] Progressing: [] Met: [] Not Met: [] Adjusted  4. Patient will return to ambulation on all surfaces without increased symptoms or restriction. [] Progressing: [] Met: [] Not Met: [] Adjusted  5. Patient will return to navigating stairs without increased symptoms or restriction. [] Progressing: [] Met: [] Not Met: [] Adjusted      Physical Therapy Evaluation Complexity Justification  [] A history of present problem with:  [] no personal factors and/or comorbidities that impact the plan of care;  [x]1-2 personal factors and/or comorbidities that impact the plan of care  []3 personal factors and/or comorbidities that impact the plan of care  [] An examination of body systems using standardized tests and measures addressing any of the following: body structures and functions (impairments), activity limitations, and/or participation restrictions;:  [] a total of 1-2 or more elements   [x] a total of 3 or more elements   [] a total of 4 or more elements   [] A clinical presentation with:  [x] stable and/or uncomplicated characteristics   [] evolving clinical presentation with changing characteristics  [] unstable and unpredictable characteristics;   [] Clinical decision making of [] low, [] moderate, [] high complexity using standardized patient assessment instrument and/or measurable assessment of functional outcome.     [x] EVAL (LOW) 03369 (typically 20 minutes face-to-face)  [] EVAL (MOD) 69839 (typically 30 minutes face-to-face)  [] EVAL (HIGH) 32362 (typically 45 minutes face-to-face)  [] RE-EVAL 59899    Electronically signed by:    Wendy Macario, PT, DPT, Cert DN

## 2021-11-11 NOTE — FLOWSHEET NOTE
[address] [facility], [place]                                                         Physical Therapy Daily Treatment Note  Date:  2021    Patient Name:  Cydney Stinson    :  1975  MRN: [id number]    Medical/Treatment Diagnosis Information:  · Diagnosis: M76.51 (ICD-10-CM) - Patellar tendinitis of right knee; sp plica removal and patellar debridement 11/10  · Treatment Diagnosis: M25.561 R knee pain  Insurance/Certification information:  PT Insurance Information: UMR  Physician Information:  Referring Practitioner: Kathy Pires  Has the plan of care been signed (Y/N):        []  Yes  [x]  No     Date of Patient follow up with Physician: 21 suture removal       Is this a Progress Report:     []  Yes  [x]  No        Progress report will be due (10 Rx or 30 days whichever is less):        Recertification will be due (POC Duration  / 90 days whichever is less):          Visit # Insurance Allowable Auth Required   1 45 []  Yes []  No        Functional Scale: LEFs 88%     Date assessed:  21      Latex Allergy:  [x]NO      []YES  Preferred Language for Healthcare:   [x]English       []other:    Pain level:  3-4/10     SUBJECTIVE:  See eval    OBJECTIVE: See eval   Observation:    Test measurements:    Effusion  RIGHT LEFT   10cm superior to patella     Midline patella     10cm inferior to patella           Flexibility L   R   Comment     Quadricep           Hamstring            Gastroc            IT Band                                                   ROM   PROM   AROM   Overpressure   Comment        L   R   L   R   L   R        Flexion                        Extension                                                                                   RESTRICTIONS/PRECAUTIONS: 0-90 motion for 2 weeks, W    Exercises/Interventions:   Exercises:  Exercise/Equipment Resistance/Repetitions Other comments Stretching     Hamstring 5x30    Hip Flexion     ITB     Grion     Quad     Inclined Calf     Towel Pull 5x30         SLR     Supine x10 Mod A     Prone     Abduction     Adducton     SLR+     Clams                    Isometrics     Quad sets 10x10    Hip Adduction     Hamstring                    Patellar Glides     Medial     Superior     Inferior          ROM     Sheet Pulls     Wall Slides     Edge of bed     Flexionator     Extensionator     Hang Weights     Ankle Pumps                              CKC     Calf raises     Wall sits     Step ups     Step up and over     Lateral Step Downs               Mini squats     CC TKE          Lateral band walks     Side Planks     Half moon     Single leg flow          Biodex-balance     Single leg stance     Plyoback          Stool scoots     SB bridge     SB HS Curls     Planks          PRE     Extension  RANGE: 90/40   Flexion  RANGE: 0/90        Cable Column          Leg Press  RANGE: 70/10        Bike     Treadmill                     Therapeutic Exercise and NMR EXR  [] (72279) Provided verbal/tactile cueing for activities related to strengthening, flexibility, endurance, ROM for improvements in LE, proximal hip, and core control with self care, mobility, lifting, ambulation.  [] (03327) Provided verbal/tactile cueing for activities related to improving balance, coordination, kinesthetic sense, posture, motor skill, proprioception  to assist with LE, proximal hip, and core control in self care, mobility, lifting, ambulation and eccentric single leg control.      NMR and Therapeutic Activities:    [] (52499 or 70363) Provided verbal/tactile cueing for activities related to improving balance, coordination, kinesthetic sense, posture, motor skill, proprioception and motor activation to allow for proper function of core, proximal hip and LE with self care and ADLs  [] (65914) Gait Re-education- Provided training and instruction to the patient for proper LE, core and proximal hip recruitment and positioning and eccentric body weight control with ambulation re-education including up and down stairs     Home Exercise Program:    [x] (85492) Reviewed/Progressed HEP activities related to strengthening, flexibility, endurance, ROM of core, proximal hip and LE for functional self-care, mobility, lifting and ambulation/stair navigation   [] (40139)Reviewed/Progressed HEP activities related to improving balance, coordination, kinesthetic sense, posture, motor skill, proprioception of core, proximal hip and LE for self care, mobility, lifting, and ambulation/stair navigation      Manual Treatments:  PROM / STM / Oscillations-Mobs:  G-I, II, III, IV (PA's, Inf., Post.)  [] (52092) Provided manual therapy to mobilize LE, proximal hip and/or LS spine soft tissue/joints for the purpose of modulating pain, promoting relaxation,  increasing ROM, reducing/eliminating soft tissue swelling/inflammation/restriction, improving soft tissue extensibility and allowing for proper ROM for normal function with self care, mobility, lifting and ambulation. Modalities:  Vaso x15'     Charges:  Timed Code Treatment Minutes: 25   Total Treatment Minutes: 50     [x] EVAL (LOW) 65425   [] EVAL (MOD) 01169   [] EVAL (HIGH) 21393   [] RE-EVAL   [x] FM(02957) x 1    [] IONTO  [] NMR (97079) x     [x] VASO  [] Manual (10179) x      [] Other:  [x] TA x  1    [] Mech Traction (17772)  [] ES(attended) (43776)      [] ES (un) (63450):       HEP instruction:   Access Code: Annamaria Westbrook  URL: [email or web address]  Date: 11/11/2021  Prepared by: Rommel Bermeo    Exercises  Seated Table Hamstring Stretch - 1 x daily - 7 x weekly - 1 sets - 5 reps - 30 hold  Long Sitting Calf Stretch with Strap - 1 x daily - 7 x weekly - 1 sets - 5 reps - 30 hold  Long Sitting Quad Set - 1 x daily - 7 x weekly - 1 sets - 10 reps - 10 hold  Straight Leg Raise - 1 x daily - 7 x weekly - 3 sets - 10 reps  Sidelying Hip Abduction - 1 x daily - 7 x weekly - 3 sets - 10 reps  Sitting Heel Slide with Towel - 1 x daily - 7 x weekly - 1 sets - 10 reps - 10 hold    GOALS:  Patient stated goal: return to walking without pain, return to golfing   [] Progressing: [] Met: [] Not Met: [] Adjusted    Therapist goals for Patient:   Short Term Goals: To be achieved in: 2 weeks  1. Independent in HEP and progression per patient tolerance, in order to prevent re-injury. [] Progressing: [] Met: [] Not Met: [] Adjusted   2. Patient will have a decrease in pain to facilitate improvement in movement, function, and ADLs as indicated by Functional Deficits. [] Progressing: [] Met: [] Not Met: [] Adjusted    Long Term Goals: To be achieved in: 6-8 weeks  1. Disability index score of 20% or less for the LEFS to assist with reaching prior level of function. [] Progressing: [] Met: [] Not Met: [] Adjusted  2. Patient will demonstrate increased AROM to 120+ flexion, 0+ extension  to allow for proper joint functioning as indicated by patients Functional Deficits. [] Progressing: [] Met: [] Not Met: [] Adjusted  3. Patient will demonstrate an increase in Strength to 4/5 or greater  to allow for proper functional mobility as indicated by patients Functional Deficits. [] Progressing: [] Met: [] Not Met: [] Adjusted  4. Patient will return to ambulation on all surfaces without increased symptoms or restriction. [] Progressing: [] Met: [] Not Met: [] Adjusted  5. Patient will return to navigating stairs without increased symptoms or restriction. [] Progressing: [] Met: [] Not Met: [] Adjusted        Overall Progression Towards Functional goals/ Treatment Progress Update:  [] Patient is progressing as expected towards functional goals listed. [] Progression is slowed due to complexities/Impairments listed. [] Progression has been slowed due to co-morbidities.   [x] Plan just implemented, too soon to assess goals progression <30days   [] Goals require adjustment due to lack of progress  [] Patient is not progressing as expected and requires additional follow up with physician  [] Other    Prognosis for POC: [x] Good [] Fair  [] Poor      Patient requires continued skilled intervention: [x] Yes  [] No    Treatment/Activity Tolerance:  [x] Patient able to complete treatment  [] Patient limited by fatigue  [] Patient limited by pain     [] Patient limited by other medical complications  [] Other: after aspiration by MD, pt noted feeling lightheaded/dizzy. He was reclined on plinth table, legs elevated on stool. After 5 minutes pt notes that he is feeling better. Held heel slides today and ended session at that time with vaso. Did discuss WBAT precaution in immobilizer but that he should not progress off crutches until he sees PT next week. D/t ROM limitation heel slides were not shown today. Return to Play: (if applicable)   []  Stage 1: Intro to Strength   []  Stage 2: Return to Run and Strength   []  Stage 3: Return to Jump and Strength   []  Stage 4: Dynamic Strength and Agility   []  Stage 5: Sport Specific Training     []  Ready to Return to Play, Meets All Above Stages   []  Not Ready for Return to Sport   Comments:                               PLAN: See eval  [] Continue per plan of care [] Alter current plan (see comments above)  [x] Plan of care initiated [] Hold pending MD visit [] Discharge  Note: If patient does not return for scheduled/ recommended follow up visits, this note will serve as a discharge from care along with most recent update on progress. Reviewed insurance benefits for physical therapy in an outpatient hospital based setting with the patient, including deductible  and allowable visit number.  Pt was informed of possible out of pocket costs, as well as, informed of other service options for continuing supervised sessions without required skilled PT intervention such as the cash based [employer] program.     Electronically signed by: Wai Pacheco, PT, DPT, Cert DN

## 2021-11-17 ENCOUNTER — HOSPITAL ENCOUNTER (OUTPATIENT)
Dept: PHYSICAL THERAPY | Age: 46
Setting detail: THERAPIES SERIES
Discharge: HOME | End: 2021-11-17
Payer: COMMERCIAL

## 2021-11-17 PROCEDURE — 97530 THERAPEUTIC ACTIVITIES: CPT | Performed by: PHYSICAL THERAPIST

## 2021-11-17 PROCEDURE — 97016 VASOPNEUMATIC DEVICE THERAPY: CPT | Performed by: PHYSICAL THERAPIST

## 2021-11-17 PROCEDURE — 97110 THERAPEUTIC EXERCISES: CPT | Performed by: PHYSICAL THERAPIST

## 2021-11-17 NOTE — FLOWSHEET NOTE
[facility] a Department of [address]  [facility] [place] [address], [address]  Office: [phone]  Fax:  [phone]                                                       Physical Therapy Daily Treatment Note  Date:  2021    Patient Name:  Sara Escalante    :  1975  MRN: [id number]    Medical/Treatment Diagnosis Information:  · Diagnosis: M76.51 (ICD-10-CM) - Patellar tendinitis of right knee; sp plica removal and patellar debridement 11/10  · Treatment Diagnosis: M25.561 R knee pain  Insurance/Certification information:  PT Insurance Information: UMR  Physician Information:  Referring Practitioner: Bart Draper  Has the plan of care been signed (Y/N):        []  Yes  [x]  No     Date of Patient follow up with Physician: 21 suture removal       Is this a Progress Report:     []  Yes  [x]  No        Progress report will be due (10 Rx or 30 days whichever is less):        Recertification will be due (POC Duration  / 90 days whichever is less):          Visit # Insurance Allowable Auth Required   2 45 through 21-3/31/22 []  Yes []  No        Functional Scale: LEFs 88%     Date assessed:  21      Latex Allergy:  [x]NO      []YES  Preferred Language for Healthcare:   [x]English       []other:    Pain level:  1/10     SUBJECTIVE:  It feels pretty good. It feels much better. He doesn't have any pain putting weight onto it. He has returned to work yesterday. This has gone well. He does feel like his knee wants to pop at times.       OBJECTIVE: 21    Observation:    Test measurements:       Effusion  RIGHT LEFT   10cm superior to patella     Midline patella     10cm inferior to patella       Flexibility L R Comment   Hamstring      Gastroc      ITB      Quad                ROM PROM AROM Overpressure Comment    L R L R L R    Flexion  88        Extension   0                               Strength L R Comment   Quad      Hamstring      Gastroc      Hip flexor      Hip ABD                         RESTRICTIONS/PRECAUTIONS: 0-90 motion for 2 weeks then full motion. WBAT    Exercises/Interventions:   Exercises:  Exercise/Equipment Resistance/Repetitions Other comments   Stretching     Hamstring 5x30    Hip Flexion     ITB     Grion     Quad     Inclined Calf NV    Towel Pull 5x30         SLR     Supine 3x10    Prone 3x10    Abduction 3x10    Adducton          SLR+ ABD 3x:30    SLR+     Clams                    Isometrics     Quad sets 10x10    Hip Adduction 10x:10    Hamstring                    Patellar Glides     Medial     Superior     Inferior          ROM     Sheet Pulls 10x:10 Stopped at 90   Wall Slides     Edge of bed     Flexionator     Extensionator     Hang Weights     Ankle Pumps                              CKC     Calf raises NV    Wall sits     Step ups     Step up and over     Lateral Step Downs               Mini squats     CC TKE 3x10 prone        Lateral band walks     Side Planks     Half moon     Single leg flow          Biodex-balance     Single leg stance     Plyoback          Stool scoots     SB bridge     SB HS Curls     Planks          PRE     Extension  RANGE: 90/40   Flexion  RANGE: 0/90        Cable Column          Leg Press  RANGE: 70/10        Bike     Treadmill                     Therapeutic Exercise and NMR EXR  [] (74741) Provided verbal/tactile cueing for activities related to strengthening, flexibility, endurance, ROM for improvements in LE, proximal hip, and core control with self care, mobility, lifting, ambulation.  [] (99180) Provided verbal/tactile cueing for activities related to improving balance, coordination, kinesthetic sense, posture, motor skill, proprioception  to assist with LE, proximal hip, and core control in self care, mobility, lifting, ambulation and eccentric single leg control.      NMR and Therapeutic Activities:    [] (27427 or 45824) Provided verbal/tactile cueing for activities related to improving balance, coordination, kinesthetic sense, posture, motor skill, proprioception and motor activation to allow for proper function of core, proximal hip and LE with self care and ADLs  [] (69322) Gait Re-education- Provided training and instruction to the patient for proper LE, core and proximal hip recruitment and positioning and eccentric body weight control with ambulation re-education including up and down stairs     Home Exercise Program:    [x] (68411) Reviewed/Progressed HEP activities related to strengthening, flexibility, endurance, ROM of core, proximal hip and LE for functional self-care, mobility, lifting and ambulation/stair navigation   [] (00681)Reviewed/Progressed HEP activities related to improving balance, coordination, kinesthetic sense, posture, motor skill, proprioception of core, proximal hip and LE for self care, mobility, lifting, and ambulation/stair navigation      Manual Treatments:  PROM / STM / Oscillations-Mobs:  G-I, II, III, IV (PA's, Inf., Post.)  [] (70554) Provided manual therapy to mobilize LE, proximal hip and/or LS spine soft tissue/joints for the purpose of modulating pain, promoting relaxation,  increasing ROM, reducing/eliminating soft tissue swelling/inflammation/restriction, improving soft tissue extensibility and allowing for proper ROM for normal function with self care, mobility, lifting and ambulation. Modalities:  Vaso x15'     Charges:   Timed Code Treatment Minutes: 35'   Total Treatment Minutes: 54'     [] EVAL (LOW) 455 1011   [] EVAL (MOD) 76313   [] EVAL (HIGH) 26788   [] RE-EVAL   [x] WA(19536) x 1    [] IONTO  [] NMR (64619) x     [x] VASO  [] Manual (20528) x      [] Other:  [x] TA x  1    [] Mech Traction (11275)  [] ES(attended) (39328)      [] ES (un) (49170): Access Code: [email or web address]  URL: [email or web address]  Date: 11/17/2021  Prepared by: Flor Tillman    Exercises  Seated Table Hamstring Stretch - 2 x daily - 7 x weekly - 1 sets - 5 reps - 30 hold  Long Sitting Calf Stretch with Strap - 2 x daily - 7 x weekly - 1 sets - 5 reps - 30 hold  Long Sitting Quad Set - 2 x daily - 7 x weekly - 1 sets - 10 reps - 10 hold  Straight Leg Raise - 2 x daily - 7 x weekly - 3 sets - 10 reps  Sidelying Hip Abduction - 2 x daily - 7 x weekly - 3 sets - 10 reps  Prone Hip Extension - 2 x daily - 7 x weekly - 3 sets - 10 reps  Prone Terminal Knee Extension - 2 x daily - 7 x weekly - 3 sets - 10 reps      GOALS:  Patient stated goal: return to walking without pain, return to golfing   [] Progressing: [] Met: [] Not Met: [] Adjusted    Therapist goals for Patient:   Short Term Goals: To be achieved in: 2 weeks  1. Independent in HEP and progression per patient tolerance, in order to prevent re-injury. [] Progressing: [] Met: [] Not Met: [] Adjusted   2. Patient will have a decrease in pain to facilitate improvement in movement, function, and ADLs as indicated by Functional Deficits. [] Progressing: [] Met: [] Not Met: [] Adjusted    Long Term Goals: To be achieved in: 6-8 weeks  1. Disability index score of 20% or less for the LEFS to assist with reaching prior level of function. [] Progressing: [] Met: [] Not Met: [] Adjusted  2. Patient will demonstrate increased AROM to 120+ flexion, 0+ extension  to allow for proper joint functioning as indicated by patients Functional Deficits. [] Progressing: [] Met: [] Not Met: [] Adjusted  3. Patient will demonstrate an increase in Strength to 4/5 or greater  to allow for proper functional mobility as indicated by patients Functional Deficits. [] Progressing: [] Met: [] Not Met: [] Adjusted  4. Patient will return to ambulation on all surfaces without increased symptoms or restriction. [] Progressing: [] Met: [] Not Met: [] Adjusted  5. Patient will return to navigating stairs without increased symptoms or restriction.    [] Progressing: [] Met: [] Not Met: [] Adjusted        Overall Progression Towards Functional goals/ Treatment Progress Update:  [] Patient is progressing as expected towards functional goals listed. [] Progression is slowed due to complexities/Impairments listed. [] Progression has been slowed due to co-morbidities. [x] Plan just implemented, too soon to assess goals progression <30days   [] Goals require adjustment due to lack of progress  [] Patient is not progressing as expected and requires additional follow up with physician  [] Other    Prognosis for POC: [x] Good [] Fair  [] Poor      Patient requires continued skilled intervention: [x] Yes  [] No    Treatment/Activity Tolerance:  [x] Patient able to complete treatment  [] Patient limited by fatigue  [] Patient limited by pain     [] Patient limited by other medical complications  [] Other: Pt shan tx well. He is not relying on his crutches much. His incision is covered and clean without S&S of infection. I did instruct him on use of 1 crutch. He will use 2 if in the community or out for long periods of time. Pt is understanding and agreeable. Pt's HEP was updated. He is doing very well overall. Pt would continue to benefit from skilled PT to improve strength, ROM, pain, and function. Return to Play: (if applicable)   []  Stage 1: Intro to Strength   []  Stage 2: Return to Run and Strength   []  Stage 3: Return to Jump and Strength   []  Stage 4: Dynamic Strength and Agility   []  Stage 5: Sport Specific Training     []  Ready to Return to Play, Meets All Above Stages   []  Not Ready for Return to Sport   Comments:                               PLAN: See eval.  Progress to more WB activities as listed in flowsheet.    [x] Continue per plan of care [] Alter current plan (see comments above)  [] Plan of care initiated [] Hold pending MD visit [] Discharge  Note: If patient does not return for scheduled/ recommended follow up visits, this note will serve as a discharge from care along with most recent update on progress.         Electronically signed by:   Ananda Torres, DPT [id number]

## 2021-11-19 ENCOUNTER — HOSPITAL ENCOUNTER (OUTPATIENT)
Dept: PHYSICAL THERAPY | Age: 46
Setting detail: THERAPIES SERIES
Discharge: HOME | End: 2021-11-19
Payer: COMMERCIAL

## 2021-11-19 ENCOUNTER — OFFICE VISIT (OUTPATIENT)
Dept: ORTHOPEDIC SURGERY | Age: 46
End: 2021-11-19
Payer: COMMERCIAL

## 2021-11-19 VITALS — HEIGHT: 74 IN | BODY MASS INDEX: 29.77 KG/M2 | WEIGHT: 232 LBS | RESPIRATION RATE: 12 BRPM

## 2021-11-19 DIAGNOSIS — M76.51 PATELLAR TENDINITIS OF RIGHT KNEE: ICD-10-CM

## 2021-11-19 DIAGNOSIS — M25.561 ACUTE PAIN OF RIGHT KNEE: Primary | ICD-10-CM

## 2021-11-19 PROCEDURE — 97530 THERAPEUTIC ACTIVITIES: CPT | Performed by: PHYSICAL THERAPIST

## 2021-11-19 PROCEDURE — 99024 POSTOP FOLLOW-UP VISIT: CPT | Performed by: ORTHOPAEDIC SURGERY

## 2021-11-19 PROCEDURE — L1812 KO ELASTIC W/JOINTS PRE OTS: HCPCS | Performed by: ORTHOPAEDIC SURGERY

## 2021-11-19 PROCEDURE — 97110 THERAPEUTIC EXERCISES: CPT | Performed by: PHYSICAL THERAPIST

## 2021-11-19 PROCEDURE — 97016 VASOPNEUMATIC DEVICE THERAPY: CPT | Performed by: PHYSICAL THERAPIST

## 2021-11-19 NOTE — FLOWSHEET NOTE
Hamstring      Gastroc      Hip flexor      Hip ABD                         RESTRICTIONS/PRECAUTIONS: 0-90 motion for 2 weeks then full motion. WBAT    Exercises/Interventions:   Exercises:  Exercise/Equipment Resistance/Repetitions Other comments   Stretching     Hamstring 5x30    Hip Flexion     ITB     Grion     Quad     Inclined Calf 5x:30    Towel Pull          SLR     Supine 3x10 1#    Prone 3x10 1#    Abduction 3x10 1#    Adducton 3x10 1#         SLR+ ABD 5x:30    SLR+     Clams                    Isometrics     Quad sets 10x10    Hip Adduction     Hamstring                    Patellar Glides     Medial     Superior     Inferior          ROM     Sheet Pulls  Stopped at 90   Wall Slides     Edge of bed     Flexionator     Extensionator     Hang Weights     Ankle Pumps                              CKC     Calf raises 3x10    Wall sits     Step ups     Step up and over     Lateral Step Downs               Mini squats     CC TKE 3x10 50#         Lateral band walks     Side Planks     Half moon     Single leg flow          Biodex-balance     Single leg stance 5x:30    Plyoback          Stool scoots     SB bridge     SB HS Curls     Planks          PRE     Extension  RANGE: 90/40   Flexion  RANGE: 0/90        Cable Column          Leg Press  RANGE: 70/10        Bike     Treadmill                     Therapeutic Exercise and NMR EXR  [] (60465) Provided verbal/tactile cueing for activities related to strengthening, flexibility, endurance, ROM for improvements in LE, proximal hip, and core control with self care, mobility, lifting, ambulation.  [] (26117) Provided verbal/tactile cueing for activities related to improving balance, coordination, kinesthetic sense, posture, motor skill, proprioception  to assist with LE, proximal hip, and core control in self care, mobility, lifting, ambulation and eccentric single leg control.      NMR and Therapeutic Activities:    [] (38939 or 56504) Provided verbal/tactile cueing for activities related to improving balance, coordination, kinesthetic sense, posture, motor skill, proprioception and motor activation to allow for proper function of core, proximal hip and LE with self care and ADLs  [] (51742) Gait Re-education- Provided training and instruction to the patient for proper LE, core and proximal hip recruitment and positioning and eccentric body weight control with ambulation re-education including up and down stairs     Home Exercise Program:    [x] (92215) Reviewed/Progressed HEP activities related to strengthening, flexibility, endurance, ROM of core, proximal hip and LE for functional self-care, mobility, lifting and ambulation/stair navigation   [] (41394)Reviewed/Progressed HEP activities related to improving balance, coordination, kinesthetic sense, posture, motor skill, proprioception of core, proximal hip and LE for self care, mobility, lifting, and ambulation/stair navigation      Manual Treatments:  PROM / STM / Oscillations-Mobs:  G-I, II, III, IV (PA's, Inf., Post.)  [] (12616) Provided manual therapy to mobilize LE, proximal hip and/or LS spine soft tissue/joints for the purpose of modulating pain, promoting relaxation,  increasing ROM, reducing/eliminating soft tissue swelling/inflammation/restriction, improving soft tissue extensibility and allowing for proper ROM for normal function with self care, mobility, lifting and ambulation. Modalities:  Alex Flores'     Charges:   Timed Code Treatment Minutes: 30'   Total Treatment Minutes: 48'     [] EVAL (LOW) 455 1011   [] EVAL (MOD) 78348   [] EVAL (HIGH) 94380   [] RE-EVAL   [x] GC(90761) x 1    [] IONTO  [] NMR (90709) x     [x] VASO  [] Manual (48365) x      [] Other:  [x] TA x  1    [] Mech Traction (67480)  [] ES(attended) (76808)      [] ES (un) (18748): Access Code: [email or web address]  URL: [email or web address]  Date: 11/17/2021  Prepared by: Carlos Llanes    Exercises  Seated Table Hamstring Stretch - 2 x daily - 7 x weekly - 1 sets - 5 reps - 30 hold  Long Sitting Calf Stretch with Strap - 2 x daily - 7 x weekly - 1 sets - 5 reps - 30 hold  Long Sitting Quad Set - 2 x daily - 7 x weekly - 1 sets - 10 reps - 10 hold  Straight Leg Raise - 2 x daily - 7 x weekly - 3 sets - 10 reps  Sidelying Hip Abduction - 2 x daily - 7 x weekly - 3 sets - 10 reps  Prone Hip Extension - 2 x daily - 7 x weekly - 3 sets - 10 reps  Prone Terminal Knee Extension - 2 x daily - 7 x weekly - 3 sets - 10 reps      GOALS:  Patient stated goal: return to walking without pain, return to golfing   [] Progressing: [] Met: [] Not Met: [] Adjusted    Therapist goals for Patient:   Short Term Goals: To be achieved in: 2 weeks  1. Independent in HEP and progression per patient tolerance, in order to prevent re-injury. [] Progressing: [] Met: [] Not Met: [] Adjusted   2. Patient will have a decrease in pain to facilitate improvement in movement, function, and ADLs as indicated by Functional Deficits. [] Progressing: [] Met: [] Not Met: [] Adjusted    Long Term Goals: To be achieved in: 6-8 weeks  1. Disability index score of 20% or less for the LEFS to assist with reaching prior level of function. [] Progressing: [] Met: [] Not Met: [] Adjusted  2. Patient will demonstrate increased AROM to 120+ flexion, 0+ extension  to allow for proper joint functioning as indicated by patients Functional Deficits. [] Progressing: [] Met: [] Not Met: [] Adjusted  3. Patient will demonstrate an increase in Strength to 4/5 or greater  to allow for proper functional mobility as indicated by patients Functional Deficits. [] Progressing: [] Met: [] Not Met: [] Adjusted  4. Patient will return to ambulation on all surfaces without increased symptoms or restriction. [] Progressing: [] Met: [] Not Met: [] Adjusted  5. Patient will return to navigating stairs without increased symptoms or restriction.    [] Progressing: [] Met: [] Not Met: [] recommended follow up visits, this note will serve as a discharge from care along with most recent update on progress.         Electronically signed by:   Una Hoyt, [address] [facility], DPT [id number]

## 2021-11-22 ENCOUNTER — HOSPITAL ENCOUNTER (OUTPATIENT)
Dept: PHYSICAL THERAPY | Age: 46
Setting detail: THERAPIES SERIES
Discharge: HOME | End: 2021-11-22
Payer: COMMERCIAL

## 2021-11-22 PROCEDURE — 97530 THERAPEUTIC ACTIVITIES: CPT | Performed by: PHYSICAL THERAPIST

## 2021-11-22 PROCEDURE — 97110 THERAPEUTIC EXERCISES: CPT | Performed by: PHYSICAL THERAPIST

## 2021-11-22 PROCEDURE — 97112 NEUROMUSCULAR REEDUCATION: CPT | Performed by: PHYSICAL THERAPIST

## 2021-11-22 NOTE — FLOWSHEET NOTE
[facility] a Department of [address]  Office: [phone]  Fax:  [phone]                                                       Physical Therapy Daily Treatment Note  Date:  2021    Patient Name:  Enzo Dang    :  1975  MRN: [id number]    Medical/Treatment Diagnosis Information:  · Diagnosis: M76.51 (ICD-10-CM) - Patellar tendinitis of right knee; sp plica removal and patellar debridement 11/10  · Treatment Diagnosis: M25.561 R knee pain  Insurance/Certification information:  PT Insurance Information: UMR  Physician Information:  Referring Practitioner: Luis Miguel Campo  Has the plan of care been signed (Y/N):        []  Yes  [x]  No     Date of Patient follow up with Physician: 21 suture removal       Is this a Progress Report:     []  Yes  [x]  No        Progress report will be due (10 Rx or 30 days whichever is less): 95/10/18       Recertification will be due (POC Duration  / 90 days whichever is less):          Visit # Insurance Allowable Auth Required   4 45 through 21-3/31/22 []  Yes []  No        Functional Scale: LEFs 88%     Date assessed:  21      Latex Allergy:  [x]NO      []YES  Preferred Language for Healthcare:   [x]English       []other:    Pain level:  0/10     SUBJECTIVE:  It's still sore. It doesn't hurt him. He is getting around well without the crutch. He hasn't been on his feet long enough to need it.       OBJECTIVE: 21    Observation:    Test measurements:       Effusion  RIGHT LEFT   10cm superior to patella     Midline patella     10cm inferior to patella       Flexibility L R Comment   Hamstring      Gastroc      ITB      Quad                ROM PROM AROM Overpressure Comment    L R L R L R    Flexion  100 instructed to stop due to protocol        Extension   0                               Strength L R Comment   Quad      Hamstring Gastroc      Hip flexor      Hip ABD                         RESTRICTIONS/PRECAUTIONS: 0-90 motion for 2 weeks then full motion. WBAT    Exercises/Interventions:   Exercises:  Exercise/Equipment Resistance/Repetitions Other comments   Stretching     Hamstring 5x30    Hip Flexion     ITB     Grion     Quad     Inclined Calf 5x:30    Towel Pull          SLR     Supine 3x10 2#    Prone 3x10 2#    Abduction 3x10 2#    Adducton 3x10 2#         SLR+ ABD 5x:30    SLR+     Clams 3x10 4#                   Isometrics     Quad sets     Hip Adduction     Hamstring                    Patellar Glides     Medial     Superior     Inferior          ROM     Sheet Pulls  Stopped at 90   Wall Slides     Edge of bed     Flexionator     Extensionator     Hang Weights     Ankle Pumps                              CKC     Calf raises 3x10 SL    Wall sits     Step ups     Step up and over     Lateral Step Downs               Mini squats     CC TKE 3x10 70#         Lateral band walks     Side Planks     Half moon     Single leg flow          Biodex-balance     Single leg stance 5x:30    Plyoback          Stool scoots     SB bridge     SB HS Curls     Planks          PRE     Extension  RANGE: 90/40   Flexion 3x10 standing RANGE: 0/90        Cable Column          Leg Press 10x:10 isometric RANGE: 70/10        Bike     Treadmill                     Therapeutic Exercise and NMR EXR  [] (64289) Provided verbal/tactile cueing for activities related to strengthening, flexibility, endurance, ROM for improvements in LE, proximal hip, and core control with self care, mobility, lifting, ambulation.  [] (37666) Provided verbal/tactile cueing for activities related to improving balance, coordination, kinesthetic sense, posture, motor skill, proprioception  to assist with LE, proximal hip, and core control in self care, mobility, lifting, ambulation and eccentric single leg control.      NMR and Therapeutic Activities:    [] (07698 or 47363) Provided verbal/tactile cueing for activities related to improving balance, coordination, kinesthetic sense, posture, motor skill, proprioception and motor activation to allow for proper function of core, proximal hip and LE with self care and ADLs  [] (87833) Gait Re-education- Provided training and instruction to the patient for proper LE, core and proximal hip recruitment and positioning and eccentric body weight control with ambulation re-education including up and down stairs     Home Exercise Program:    [x] (93331) Reviewed/Progressed HEP activities related to strengthening, flexibility, endurance, ROM of core, proximal hip and LE for functional self-care, mobility, lifting and ambulation/stair navigation   [] (20834)Reviewed/Progressed HEP activities related to improving balance, coordination, kinesthetic sense, posture, motor skill, proprioception of core, proximal hip and LE for self care, mobility, lifting, and ambulation/stair navigation      Manual Treatments:  PROM / STM / Oscillations-Mobs:  G-I, II, III, IV (PA's, Inf., Post.)  [] (80456) Provided manual therapy to mobilize LE, proximal hip and/or LS spine soft tissue/joints for the purpose of modulating pain, promoting relaxation,  increasing ROM, reducing/eliminating soft tissue swelling/inflammation/restriction, improving soft tissue extensibility and allowing for proper ROM for normal function with self care, mobility, lifting and ambulation. Modalities:  15' ice      Charges:   Timed Code Treatment Minutes: 45'   Total Treatment Minutes: 54'     [] EVAL (LOW) 30820   [] EVAL (MOD) 75618   [] EVAL (HIGH) 26090   [] RE-EVAL   [x] WJ(37756) x 1    [] IONTO  [x] NMR (38949) x 1    [] VASO  [] Manual (97372) x      [] Other:  [x] TA x  1    [] Mech Traction (75388)  [] ES(attended) (30222)      [] ES (un) (53941): Access Code: [email or web address]  URL: [email or web address]  Date: 11/17/2021  Prepared by: Krysten Larry    Exercises  Seated Table Hamstring Stretch - 2 x daily - 7 x weekly - 1 sets - 5 reps - 30 hold  Long Sitting Calf Stretch with Strap - 2 x daily - 7 x weekly - 1 sets - 5 reps - 30 hold  Long Sitting Quad Set - 2 x daily - 7 x weekly - 1 sets - 10 reps - 10 hold  Straight Leg Raise - 2 x daily - 7 x weekly - 3 sets - 10 reps  Sidelying Hip Abduction - 2 x daily - 7 x weekly - 3 sets - 10 reps  Prone Hip Extension - 2 x daily - 7 x weekly - 3 sets - 10 reps  Prone Terminal Knee Extension - 2 x daily - 7 x weekly - 3 sets - 10 reps      GOALS:  Patient stated goal: return to walking without pain, return to golfing   [] Progressing: [] Met: [] Not Met: [] Adjusted    Therapist goals for Patient:   Short Term Goals: To be achieved in: 2 weeks  1. Independent in HEP and progression per patient tolerance, in order to prevent re-injury. [] Progressing: [] Met: [] Not Met: [] Adjusted   2. Patient will have a decrease in pain to facilitate improvement in movement, function, and ADLs as indicated by Functional Deficits. [] Progressing: [] Met: [] Not Met: [] Adjusted    Long Term Goals: To be achieved in: 6-8 weeks  1. Disability index score of 20% or less for the LEFS to assist with reaching prior level of function. [] Progressing: [] Met: [] Not Met: [] Adjusted  2. Patient will demonstrate increased AROM to 120+ flexion, 0+ extension  to allow for proper joint functioning as indicated by patients Functional Deficits. [] Progressing: [] Met: [] Not Met: [] Adjusted  3. Patient will demonstrate an increase in Strength to 4/5 or greater  to allow for proper functional mobility as indicated by patients Functional Deficits. [] Progressing: [] Met: [] Not Met: [] Adjusted  4. Patient will return to ambulation on all surfaces without increased symptoms or restriction. [] Progressing: [] Met: [] Not Met: [] Adjusted  5. Patient will return to navigating stairs without increased symptoms or restriction.    [] Progressing: [] Met: [] Not Met: [] Adjusted        Overall Progression Towards Functional goals/ Treatment Progress Update:  [] Patient is progressing as expected towards functional goals listed. [] Progression is slowed due to complexities/Impairments listed. [] Progression has been slowed due to co-morbidities. [x] Plan just implemented, too soon to assess goals progression <30days   [] Goals require adjustment due to lack of progress  [] Patient is not progressing as expected and requires additional follow up with physician  [] Other    Prognosis for POC: [x] Good [] Fair  [] Poor      Patient requires continued skilled intervention: [x] Yes  [] No    Treatment/Activity Tolerance:  [x] Patient able to complete treatment  [] Patient limited by fatigue  [] Patient limited by pain     [] Patient limited by other medical complications  [] Other: Pt shan tx well. Pt did report some discomfort with/after the leg press isometric. He did lift the weight at first despite being told not to. He is progressing nicely overall. He does want to return to golf. He was tender over his patella when touched for his brace placement. We did go over the brace placement today as well. Pt would continue to benefit from skilled PT to improve strength, ROM, pain, and function. Return to Play: (if applicable)   []  Stage 1: Intro to Strength   []  Stage 2: Return to Run and Strength   []  Stage 3: Return to Jump and Strength   []  Stage 4: Dynamic Strength and Agility   []  Stage 5: Sport Specific Training     []  Ready to Return to Play, Meets All Above Stages   []  Not Ready for Return to Sport   Comments:                               PLAN:  Progress to more WB activities as listed in flowsheet.     [x] Continue per plan of care [] Alter current plan (see comments above)  [] Plan of care initiated [] Hold pending MD visit [] Discharge  Note: If patient does not return for scheduled/ recommended follow up visits, this note will serve as a discharge from care along with most recent update on progress.         Electronically signed by:   Ananda Herrera, STARRT [id number]

## 2021-11-23 ENCOUNTER — APPOINTMENT (OUTPATIENT)
Dept: PHYSICAL THERAPY | Age: 46
End: 2021-11-23
Payer: COMMERCIAL

## 2021-11-24 ENCOUNTER — HOSPITAL ENCOUNTER (OUTPATIENT)
Dept: PHYSICAL THERAPY | Age: 46
Setting detail: THERAPIES SERIES
Discharge: HOME | End: 2021-11-24
Payer: COMMERCIAL

## 2021-11-24 PROCEDURE — 97530 THERAPEUTIC ACTIVITIES: CPT | Performed by: PHYSICAL THERAPIST

## 2021-11-24 PROCEDURE — 97110 THERAPEUTIC EXERCISES: CPT | Performed by: PHYSICAL THERAPIST

## 2021-11-24 PROCEDURE — 97016 VASOPNEUMATIC DEVICE THERAPY: CPT | Performed by: PHYSICAL THERAPIST

## 2021-11-24 PROCEDURE — 97112 NEUROMUSCULAR REEDUCATION: CPT | Performed by: PHYSICAL THERAPIST

## 2021-11-24 NOTE — FLOWSHEET NOTE
100 [facility] a Department of [address]  [facility] [place] [address], [address]  Office: [phone]  Fax:  [phone]                                                       Physical Therapy Daily Treatment Note  Date:  2021    Patient Name:  Norma Montes De Oca    :  1975  MRN: [id number]    Medical/Treatment Diagnosis Information:  · Diagnosis: M76.51 (ICD-10-CM) - Patellar tendinitis of right knee; sp plica removal and patellar debridement 11/10  · Treatment Diagnosis: M25.561 R knee pain  Insurance/Certification information:  PT Insurance Information: UMR  Physician Information:  Referring Practitioner: Rodriguez Hays  Has the plan of care been signed (Y/N):        []  Yes  [x]  No     Date of Patient follow up with Physician: 21 suture removal       Is this a Progress Report:     []  Yes  [x]  No        Progress report will be due (10 Rx or 30 days whichever is less):        Recertification will be due (POC Duration  / 90 days whichever is less):          Visit # Insurance Allowable Auth Required    through 21-3/31/22 []  Yes []  No        Functional Scale: LEFs 88%     Date assessed:  21      Latex Allergy:  [x]NO      []YES  Preferred Language for Healthcare:   [x]English       []other:    Pain level:  2/10     SUBJECTIVE:  He is having pain today. He was at work yesterday then went out to eat. He was in the brace all day. He feels the brace was irritating him. He feels the knee feels a little warm and swollen. His pain is over his medial knee. He left the house at 8 yesterday AM and got home at 9 at night last night. He noticed pain last night when he got home. He couldn't wait to get the brace off. He is not taking anything for his pain. OBJECTIVE: 21    Observation: 97.8 F. Steristrips and Tegaderm were removed. No S&S of infection were noted. Incision is healing well.   Pt is not TTP over tibial tubercle or quad tendon. Pt does enter clinic with antalgic gt with decreased swing and stance time on the right. Pt is TTP over pes anserine and some over hip Adductors.  Test measurements:       Effusion  RIGHT LEFT   10cm superior to patella     Midline patella     10cm inferior to patella       Flexibility L R Comment   Hamstring 70 65 SLR   Gastroc      ITB      Quad                ROM PROM AROM Overpressure Comment    L R L R L R    Flexion  136 instructed to stop due to protocol 142       Extension                                  Strength L R Comment   Quad      Hamstring  4 with neutral, IR and ER of tibia. No major c/o pain    Gastroc      Hip flexor      Hip ABD                         RESTRICTIONS/PRECAUTIONS: 0-90 motion for 2 weeks then full motion.  WBAT    Exercises/Interventions:   Exercises:  Exercise/Equipment Resistance/Repetitions Other comments   Stretching     Hamstring 5x30    Hip Flexion     ITB     Grion     Quad     Inclined Calf 5x:30    Towel Pull 5x:30 with hamstring stretch         SLR     Supine 3x10 3#    Prone 3x10 3#    Abduction 3x10 3#    Adducton 3x10 3#         SLR+ ABD 5x:30    SLR+     Clams 3x10 6#                   Isometrics     Quad sets     Hip Adduction     Hamstring                    Patellar Glides     Medial     Superior     Inferior          ROM     Sheet Pulls 10x:10    Wall Slides     Edge of bed     Flexionator     Extensionator     Hang Weights     Ankle Pumps                              CKC     Calf raises 3x10 SL    Wall sits     Step ups     Step up and over     Lateral Step Downs               Mini squats     CC TKE 3x10 70#         Lateral band walks     Side Planks     Half moon     Single leg flow          Biodex-balance     Single leg stance 5x:30    Plyoback          Stool scoots      bridge 3x10    SB HS Curls     Planks          PRE     Extension  RANGE: 90/40   Flexion 3x10 standing RANGE: 0/90        Cable Column Leg Press  RANGE: 70/10        Bike     Treadmill                     Therapeutic Exercise and NMR EXR  [] (87301) Provided verbal/tactile cueing for activities related to strengthening, flexibility, endurance, ROM for improvements in LE, proximal hip, and core control with self care, mobility, lifting, ambulation.  [] (56929) Provided verbal/tactile cueing for activities related to improving balance, coordination, kinesthetic sense, posture, motor skill, proprioception  to assist with LE, proximal hip, and core control in self care, mobility, lifting, ambulation and eccentric single leg control.      NMR and Therapeutic Activities:    [] (77704 or 20342) Provided verbal/tactile cueing for activities related to improving balance, coordination, kinesthetic sense, posture, motor skill, proprioception and motor activation to allow for proper function of core, proximal hip and LE with self care and ADLs  [] (39399) Gait Re-education- Provided training and instruction to the patient for proper LE, core and proximal hip recruitment and positioning and eccentric body weight control with ambulation re-education including up and down stairs     Home Exercise Program:    [x] (63639) Reviewed/Progressed HEP activities related to strengthening, flexibility, endurance, ROM of core, proximal hip and LE for functional self-care, mobility, lifting and ambulation/stair navigation   [] (21074)Reviewed/Progressed HEP activities related to improving balance, coordination, kinesthetic sense, posture, motor skill, proprioception of core, proximal hip and LE for self care, mobility, lifting, and ambulation/stair navigation      Manual Treatments:  PROM / STM / Oscillations-Mobs:  G-I, II, III, IV (PA's, Inf., Post.)  [] (12463) Provided manual therapy to mobilize LE, proximal hip and/or LS spine soft tissue/joints for the purpose of modulating pain, promoting relaxation,  increasing ROM, reducing/eliminating soft tissue swelling/inflammation/restriction, improving soft tissue extensibility and allowing for proper ROM for normal function with self care, mobility, lifting and ambulation. Modalities:  15' vaso medium pressure     Charges:   Timed Code Treatment Minutes: 45'   Total Treatment Minutes: 72'     [] EVAL (LOW) 455 1011   [] EVAL (MOD) 81416   [] EVAL (HIGH) 30242   [] RE-EVAL   [x] JA(03138) x 1    [] IONTO  [x] NMR (36121) x 1    [] VASO  [] Manual (92003) x      [] Other:  [x] TA x  1    [] Mech Traction (68417)  [] ES(attended) (81834)      [] ES (un) (74190): Access Code: [email or web address]  URL: [email or web address]  Date: 11/24/2021  Prepared by: Joshua Llanes    Exercises  Standing Gastroc Stretch - 2 x daily - 7 x weekly - 5 reps - 30 hold  Seated Table Hamstring Stretch - 2 x daily - 7 x weekly - 1 sets - 5 reps - 30 hold  Long Sitting Calf Stretch with Strap - 2 x daily - 7 x weekly - 1 sets - 5 reps - 30 hold  Long Sitting Quad Set - 2 x daily - 7 x weekly - 1 sets - 10 reps - 10 hold  Straight Leg Raise - 2 x daily - 7 x weekly - 3 sets - 10 reps  Sidelying Hip Abduction - 2 x daily - 7 x weekly - 3 sets - 10 reps  Prone Hip Extension - 2 x daily - 7 x weekly - 3 sets - 10 reps  Sidelying Hip Adduction - 2 x daily - 7 x weekly - 3 sets - 10 reps  Standing Heel Raise - 1 x daily - 7 x weekly - 3 sets - 10 reps        GOALS:  Patient stated goal: return to walking without pain, return to golfing   [] Progressing: [] Met: [] Not Met: [] Adjusted    Therapist goals for Patient:   Short Term Goals: To be achieved in: 2 weeks  1. Independent in HEP and progression per patient tolerance, in order to prevent re-injury. [] Progressing: [] Met: [] Not Met: [] Adjusted   2. Patient will have a decrease in pain to facilitate improvement in movement, function, and ADLs as indicated by Functional Deficits. [] Progressing: [] Met: [] Not Met: [] Adjusted    Long Term Goals: To be achieved in: 6-8 weeks  1. Disability index score of 20% or less for the LEFS to assist with reaching prior level of function. [] Progressing: [] Met: [] Not Met: [] Adjusted  2. Patient will demonstrate increased AROM to 120+ flexion, 0+ extension  to allow for proper joint functioning as indicated by patients Functional Deficits. [] Progressing: [] Met: [] Not Met: [] Adjusted  3. Patient will demonstrate an increase in Strength to 4/5 or greater  to allow for proper functional mobility as indicated by patients Functional Deficits. [] Progressing: [] Met: [] Not Met: [] Adjusted  4. Patient will return to ambulation on all surfaces without increased symptoms or restriction. [] Progressing: [] Met: [] Not Met: [] Adjusted  5. Patient will return to navigating stairs without increased symptoms or restriction. [] Progressing: [] Met: [] Not Met: [] Adjusted        Overall Progression Towards Functional goals/ Treatment Progress Update:  [] Patient is progressing as expected towards functional goals listed. [] Progression is slowed due to complexities/Impairments listed. [] Progression has been slowed due to co-morbidities. [x] Plan just implemented, too soon to assess goals progression <30days   [] Goals require adjustment due to lack of progress  [] Patient is not progressing as expected and requires additional follow up with physician  [] Other    Prognosis for POC: [x] Good [] Fair  [] Poor      Patient requires continued skilled intervention: [x] Yes  [] No    Treatment/Activity Tolerance:  [x] Patient able to complete treatment  [] Patient limited by fatigue  [] Patient limited by pain     [] Patient limited by other medical complications  [] Other: Pt shan tx fair/well. Pt reported some discomfort with switching positions. He also reported discomfort with first step, but after that his pain improved. I did recommend that he return to using one crutch until he can walk without pain or limp.   I've also taken the medial hinge out of the brace to try to decrease the pressure on the medial knee. He reported that this felt better. I've also encouraged him to use ice more frequently. Pt did not have S&S of DVT, jt line pain, or pain over the incision. He did not have systemic symptoms either. I did instruct him to call if he had any further problems. I covered his incision with steristrips and sterile guaze. I did give him tegaderm for showering; however I gave him guaze to use, which should suffice as his incision is closed. We did discuss BFR as well. I gave him an updated HEP and asked him not to overstress the bending exercises. Pt would continue to benefit from skilled PT to improve strength, ROM, pain, and function. Return to Play: (if applicable)   []  Stage 1: Intro to Strength   []  Stage 2: Return to Run and Strength   []  Stage 3: Return to Jump and Strength   []  Stage 4: Dynamic Strength and Agility   []  Stage 5: Sport Specific Training     []  Ready to Return to Play, Meets All Above Stages   []  Not Ready for Return to Sport   Comments:                               PLAN:  Progress to more WB activities as listed in flowsheet. [x] Continue per plan of care [] Alter current plan (see comments above)  [] Plan of care initiated [] Hold pending MD visit [] Discharge  Note: If patient does not return for scheduled/ recommended follow up visits, this note will serve as a discharge from care along with most recent update on progress.         Electronically signed by:   Twanna Leyden, [place], DPT [id number]

## 2021-11-29 ENCOUNTER — HOSPITAL ENCOUNTER (OUTPATIENT)
Dept: PHYSICAL THERAPY | Age: 46
Setting detail: THERAPIES SERIES
Discharge: HOME | End: 2021-11-29
Payer: COMMERCIAL

## 2021-11-29 PROCEDURE — 97110 THERAPEUTIC EXERCISES: CPT | Performed by: PHYSICAL THERAPIST

## 2021-11-29 PROCEDURE — 97530 THERAPEUTIC ACTIVITIES: CPT | Performed by: PHYSICAL THERAPIST

## 2021-11-29 PROCEDURE — 97112 NEUROMUSCULAR REEDUCATION: CPT | Performed by: PHYSICAL THERAPIST

## 2021-11-30 ENCOUNTER — APPOINTMENT (OUTPATIENT)
Dept: PHYSICAL THERAPY | Age: 46
End: 2021-11-30
Payer: COMMERCIAL

## 2021-12-01 ENCOUNTER — APPOINTMENT (OUTPATIENT)
Dept: PHYSICAL THERAPY | Age: 46
End: 2021-12-01
Payer: COMMERCIAL

## 2021-12-03 ENCOUNTER — HOSPITAL ENCOUNTER (OUTPATIENT)
Dept: PHYSICAL THERAPY | Age: 46
Setting detail: THERAPIES SERIES
Discharge: HOME | End: 2021-12-03
Payer: COMMERCIAL

## 2021-12-03 PROCEDURE — 97112 NEUROMUSCULAR REEDUCATION: CPT | Performed by: PHYSICAL THERAPIST

## 2021-12-03 PROCEDURE — 97530 THERAPEUTIC ACTIVITIES: CPT | Performed by: PHYSICAL THERAPIST

## 2021-12-03 PROCEDURE — 97110 THERAPEUTIC EXERCISES: CPT | Performed by: PHYSICAL THERAPIST

## 2021-12-03 NOTE — FLOWSHEET NOTE
100 [facility] a Department of [address]  [place] [address], [address]  Office: [phone]  Fax:  [phone]                                                       Physical Therapy Daily Treatment Note  Date:  12/3/2021    Patient Name:  Francheska Juárez    :  1975  MRN: [id number]    Medical/Treatment Diagnosis Information:  · Diagnosis: M76.51 (ICD-10-CM) - Patellar tendinitis of right knee; sp plica removal and patellar debridement 11/10  · Treatment Diagnosis: M25.561 R knee pain  Insurance/Certification information:  PT Insurance Information: UMR  Physician Information:  Referring Practitioner: Avelina Gray  Has the plan of care been signed (Y/N):        []  Yes  [x]  No     Date of Patient follow up with Physician: 21 suture removal       Is this a Progress Report:     []  Yes  [x]  No        Progress report will be due (10 Rx or 30 days whichever is less):        Recertification will be due (POC Duration  / 90 days whichever is less):          Visit # Insurance Allowable Auth Required   7 47 through 21-3/31/22 []  Yes []  No        Functional Scale: LEFs 88%     Date assessed:  21      Latex Allergy:  [x]NO      []YES  Preferred Language for Healthcare:   [x]English       []other:    Pain level:  0/10     SUBJECTIVE: He get pain when he pushes through his leg like with steps. He wants to play golf and return to running. He wants to know when he can do that. His steri strips are starting to come off. OBJECTIVE: 21    Observation:  More normalized gt. Slow swing.    Test measurements:       Effusion  RIGHT LEFT   10cm superior to patella     Midline patella     10cm inferior to patella       Flexibility L R Comment   Hamstring   SLR   Gastroc      ITB      Quad                ROM PROM AROM Overpressure Comment    L R L R L R    Flexion    137      Extension    0 Strength L R Comment   Quad      Hamstring      Gastroc      Hip flexor      Hip ABD                         RESTRICTIONS/PRECAUTIONS: 0-90 motion for 2 weeks then full motion.  WBAT    Exercises/Interventions:   Exercises:  Exercise/Equipment Resistance/Repetitions Other comments   Stretching     Hamstring 5x30    Hip Flexion     ITB     Grion     Quad     Inclined Calf 5x:30    Towel Pull          SLR     Supine 3x10 5#    Prone 3x10 5#    Abduction 3x10 5#    Adducton 3x10 5#         SLR+ ABD 5x:30    SLR+ 3x:20    Clams 3x10 10#                   Isometrics     Quad sets     Hip Adduction     Hamstring                    Patellar Glides     Medial     Superior     Inferior          ROM     Sheet Pulls 10x:10    Wall Slides     Edge of bed     Flexionator     Extensionator     Hang Weights     Ankle Pumps                              CKC     Calf raises 3x10 SL    Wall sits     Step ups     Step up and over     Lateral Step Downs               Mini squats     CC TKE          Lateral band walks     Side Planks     Half moon     Single leg flow          Biodex-balance     Single leg stance 5x:30    Plyoback          Stool scoots      bridge SL SB HS Curls     Planks          PRE     Extension  RANGE: 90/40   Flexion 3x10 25# SL RANGE: 0/90        Cable Column          Leg Press 10x 70#. 10x90#, 10x 100# ECL  RANGE: 70/10        Bike 5'  L1    Treadmill          Cone walks 10x16' No AD                   Therapeutic Exercise and NMR EXR  [] (58536) Provided verbal/tactile cueing for activities related to strengthening, flexibility, endurance, ROM for improvements in LE, proximal hip, and core control with self care, mobility, lifting, ambulation.  [] (06762) Provided verbal/tactile cueing for activities related to improving balance, coordination, kinesthetic sense, posture, motor skill, proprioception  to assist with LE, proximal hip, and core control in self care, mobility, lifting, ambulation and eccentric single leg control. NMR and Therapeutic Activities:    [] (41663 or 80124) Provided verbal/tactile cueing for activities related to improving balance, coordination, kinesthetic sense, posture, motor skill, proprioception and motor activation to allow for proper function of core, proximal hip and LE with self care and ADLs  [] (44090) Gait Re-education- Provided training and instruction to the patient for proper LE, core and proximal hip recruitment and positioning and eccentric body weight control with ambulation re-education including up and down stairs     Home Exercise Program:    [x] (13343) Reviewed/Progressed HEP activities related to strengthening, flexibility, endurance, ROM of core, proximal hip and LE for functional self-care, mobility, lifting and ambulation/stair navigation   [] (44311)Reviewed/Progressed HEP activities related to improving balance, coordination, kinesthetic sense, posture, motor skill, proprioception of core, proximal hip and LE for self care, mobility, lifting, and ambulation/stair navigation      Manual Treatments:  PROM / STM / Oscillations-Mobs:  G-I, II, III, IV (PA's, Inf., Post.)  [] (56107) Provided manual therapy to mobilize LE, proximal hip and/or LS spine soft tissue/joints for the purpose of modulating pain, promoting relaxation,  increasing ROM, reducing/eliminating soft tissue swelling/inflammation/restriction, improving soft tissue extensibility and allowing for proper ROM for normal function with self care, mobility, lifting and ambulation. Modalities:  10' ice    Charges:   Timed Code Treatment Minutes: 40'   Total Treatment Minutes: 54'     [] EVAL (LOW) 58251   [] EVAL (MOD) 55739   [] EVAL (HIGH) 60489   [] RE-EVAL   [x] WQ(45121) x 1    [] IONTO  [x] NMR (29836) x 1    [] VASO  [] Manual (66718) x      [] Other:  [x] TA x  1    [] Mech Traction (04876)  [] ES(attended) (11978)      [] ES (un) (01071):        Access Code: Kalyani Escamilla  URL: [email or web address]  Date: 11/29/2021  Prepared by: Zaida Llanes    Exercises  Standing Gastroc Stretch - 2 x daily - 7 x weekly - 5 reps - 30 hold  Seated Table Hamstring Stretch - 2 x daily - 7 x weekly - 1 sets - 5 reps - 30 hold  Long Sitting Calf Stretch with Strap - 2 x daily - 7 x weekly - 1 sets - 5 reps - 30 hold  Supine Heel Slide with Strap - 1 x daily - 7 x weekly - 3 sets - 10 reps  Straight Leg Raise - 2 x daily - 7 x weekly - 3 sets - 10 reps  Sidelying Hip Abduction - 2 x daily - 7 x weekly - 3 sets - 10 reps  Prone Hip Extension - 2 x daily - 7 x weekly - 3 sets - 10 reps  Sidelying Hip Adduction - 2 x daily - 7 x weekly - 3 sets - 10 reps  Clamshell with Resistance - 1 x daily - 3 x weekly - 3 sets - 10 reps  Standing Single Leg Heel Raise - 1 x daily - 3 x weekly - 3 sets - 10 reps  Single Leg Bridge - 1 x daily - 3 x weekly - 3 sets - 10 reps  Single Leg Stance - 1 x daily - 3 x weekly - 5 sets - 30 hold          GOALS:  Patient stated goal: return to walking without pain, return to golfing   [] Progressing: [] Met: [] Not Met: [] Adjusted    Therapist goals for Patient:   Short Term Goals: To be achieved in: 2 weeks  1. Independent in HEP and progression per patient tolerance, in order to prevent re-injury. [] Progressing: [] Met: [] Not Met: [] Adjusted   2. Patient will have a decrease in pain to facilitate improvement in movement, function, and ADLs as indicated by Functional Deficits. [] Progressing: [] Met: [] Not Met: [] Adjusted    Long Term Goals: To be achieved in: 6-8 weeks  1. Disability index score of 20% or less for the LEFS to assist with reaching prior level of function. [] Progressing: [] Met: [] Not Met: [] Adjusted  2. Patient will demonstrate increased AROM to 120+ flexion, 0+ extension  to allow for proper joint functioning as indicated by patients Functional Deficits. [] Progressing: [] Met: [] Not Met: [] Adjusted  3.  Patient will demonstrate an increase in Strength to 4/5 or greater  to allow for proper functional mobility as indicated by patients Functional Deficits. [] Progressing: [] Met: [] Not Met: [] Adjusted  4. Patient will return to ambulation on all surfaces without increased symptoms or restriction. [] Progressing: [] Met: [] Not Met: [] Adjusted  5. Patient will return to navigating stairs without increased symptoms or restriction. [] Progressing: [] Met: [] Not Met: [] Adjusted        Overall Progression Towards Functional goals/ Treatment Progress Update:  [] Patient is progressing as expected towards functional goals listed. [] Progression is slowed due to complexities/Impairments listed. [] Progression has been slowed due to co-morbidities. [x] Plan just implemented, too soon to assess goals progression <30days   [] Goals require adjustment due to lack of progress  [] Patient is not progressing as expected and requires additional follow up with physician  [] Other    Prognosis for POC: [x] Good [] Fair  [] Poor      Patient requires continued skilled intervention: [x] Yes  [] No    Treatment/Activity Tolerance:  [x] Patient able to complete treatment  [] Patient limited by fatigue  [] Patient limited by pain     [] Patient limited by other medical complications  [] Other: Pt shan tx well. He did report some stiffness after the leg press ECL. He was advised that he could do walking pace on the elliptical for about 10-20'. He has been able to ride the bike for about 10' without c/o. He was advised to continue to use the compression sleeve if he is up a lot like when doing yard work. He was encouraged to pace himself with yard work. Pt wants to know when he can run and hit a golf ball. He was advised to wait for the time being. He was instructed he could putt, but pt wants to know when he can hit the ball. Pt would continue to benefit from skilled PT to improve strength, ROM, pain, and function.          Return to Play: (if applicable)   []  Stage 1: Intro to Strength   []  Stage 2: Return to Run and Strength   []  Stage 3: Return to Jump and Strength   []  Stage 4: Dynamic Strength and Agility   []  Stage 5: Sport Specific Training     []  Ready to Return to Play, Meets All Above Stages   []  Not Ready for Return to Sport   Comments:                               PLAN:  Progress to more WB activities as listed in flowsheet. Monitor knee stiffness with leg press. Consider decreasing the weight. Remove steristrips. [x] Continue per plan of care [] Alter current plan (see comments above)  [] Plan of care initiated [] Hold pending MD visit [] Discharge  Note: If patient does not return for scheduled/ recommended follow up visits, this note will serve as a discharge from care along with most recent update on progress.         Electronically signed by:   Patricia Najera, [facility], DPT [id number]

## 2021-12-09 ENCOUNTER — APPOINTMENT (OUTPATIENT)
Dept: PHYSICAL THERAPY | Age: 46
End: 2021-12-09
Payer: COMMERCIAL

## 2021-12-10 ENCOUNTER — OFFICE VISIT (OUTPATIENT)
Dept: ORTHOPEDIC SURGERY | Age: 46
End: 2021-12-10

## 2021-12-10 ENCOUNTER — HOSPITAL ENCOUNTER (OUTPATIENT)
Dept: PHYSICAL THERAPY | Age: 46
Setting detail: THERAPIES SERIES
Discharge: HOME | End: 2021-12-10
Payer: COMMERCIAL

## 2021-12-10 VITALS — RESPIRATION RATE: 12 BRPM | WEIGHT: 232 LBS | BODY MASS INDEX: 29.77 KG/M2 | HEIGHT: 74 IN

## 2021-12-10 DIAGNOSIS — M25.561 ACUTE PAIN OF RIGHT KNEE: ICD-10-CM

## 2021-12-10 DIAGNOSIS — M76.51 PATELLAR TENDINITIS OF RIGHT KNEE: Primary | ICD-10-CM

## 2021-12-10 PROCEDURE — 97530 THERAPEUTIC ACTIVITIES: CPT | Performed by: PHYSICAL THERAPIST

## 2021-12-10 PROCEDURE — 97110 THERAPEUTIC EXERCISES: CPT | Performed by: PHYSICAL THERAPIST

## 2021-12-10 PROCEDURE — 99024 POSTOP FOLLOW-UP VISIT: CPT | Performed by: ORTHOPAEDIC SURGERY

## 2021-12-10 PROCEDURE — 97112 NEUROMUSCULAR REEDUCATION: CPT | Performed by: PHYSICAL THERAPIST

## 2021-12-10 RX ORDER — MELOXICAM 15 MG/1
15 TABLET ORAL DAILY
Qty: 30 TABLET | Refills: 1 | Status: SHIPPED | OUTPATIENT
Start: 2021-12-10 | End: 2022-01-11 | Stop reason: ALTCHOICE

## 2021-12-10 NOTE — PROGRESS NOTES
Hayes Dawson returns today 4 weeks status post right knee patella tendon debridement. Today, he mentions that he still gets some discomfort especially with stairs. Feels similar to how he felt preoperatively. He is concerned about that. On examination today his tendon is actually quite benign. He has a trace intra-articular effusion. Quad tone is a bit depressed on the right compared to left but he has full range of motion. Assessment: I think he is progressing appropriately. Its own in 4 weeks I would not expect him to be pain-free at this point. We need to improve strength and then I think he will do well. We will put him on an anti-inflammatory to help with his little bit of swelling and check him again in a month.

## 2021-12-10 NOTE — PLAN OF CARE
[facility] a Department of [address]  Santiago Goldman [place] [address], [address]  Office: [phone]  Fax:  [phone]                                                      Physical Therapy Re-Certification Plan of Care    Dear Dr. Danyel Castillo,    We had the pleasure of treating the following patient for physical therapy services at [address] [place] [address]. A summary of our findings can be found in the updated assessment below. This includes our plan of care. If you have any questions or concerns regarding these findings, please do not hesitate to contact me at the office phone number checked above. Thank you for the referral.     Physician Signature:________________________________Date:__________________  By signing above (or electronic signature), therapists plan is approved by physician    Date Range Of Visits: 11/11/21-12/10/2021  Total Visits to Date: 10  Overall Response to Treatment:   [] Patient is responding well to treatment and improvement is noted with regards to goals   [] Patient should continue to improve in reasonable time if they continue HEP   [] Patient has plateaued and is no longer responding to skilled PT intervention    [] Patient is getting worse and would benefit from return to referring MD   [] Patient unable to adhere to initial POC   [x] Other: Pt shan tx fair/well. He reported pain after the leg press isometrics. He demo pain with leg press, but this was improved with decreasing/modifying his ROM. Pt shan BFR fair/well. We can plan on progressing this next visit. Pt req cuing t/o tx to slow down and for proper form on SLR, particularly for flex to keep knee fully ext. Pt was seen by MD today. He will start an antiinflammatory, and I've asked him to continue icing it. Pt would continue to benefit from skilled PT to improve strength, ROM, pain, and function.  Pt may wear compression to his desire. Recommend continuing tx 2x/wk with utilization of BFR x 6-8 wks with gradual reintroduction to running. Physical Therapy Daily Treatment Note  Date:  12/10/2021    Patient Name:  Porter Barrett    :  1975  MRN: [id number]    Medical/Treatment Diagnosis Information:  · Diagnosis: M76.51 (ICD-10-CM) - Patellar tendinitis of right knee; sp plica removal and patellar debridement 11/10  · Treatment Diagnosis: M25.561 R knee pain  Insurance/Certification information:  PT Insurance Information: U[employer]  Physician Information:  Referring Practitioner: Tino Petty  Has the plan of care been signed (Y/N):        []  Yes  [x]  No     Date of Patient follow up with Physician: 12/10/21      Is this a Progress Report:     []  Yes  [x]  No        Progress report will be due (10 Rx or 30 days whichever is less):        Recertification will be due (POC Duration  / 90 days whichever is less):          Visit # Insurance Allowable Auth Required   10 45 through 21-3/31/22 []  Yes []  No        Functional Scale: LEFs 61.25%     Date assessed:  12/10/21      Latex Allergy:  [x]NO      []YES  Preferred Language for Healthcare:   [x]English       []other:    Pain level:  0/10     SUBJECTIVE: He continues to have problems/pain with steps (up and down). He wants to make sure this is normal.  He is agreeable to BFR. OBJECTIVE: 12/3/21    Observation:     Test measurements:       Effusion  RIGHT LEFT   10cm superior to patella     Midline patella     10cm inferior to patella       Flexibility L R Comment   Hamstring   SLR   Gastroc      ITB      Quad                ROM PROM AROM Overpressure Comment    L R L R L R    Flexion    140      Extension    0                              Strength L R Comment   Quad      Hamstring      Gastroc      Hip flexor      Hip ABD                         RESTRICTIONS/PRECAUTIONS: 0-90 motion for 2 weeks then full motion.  WBAT    Exercises/Interventions: Exercises:  Exercise/Equipment Resistance/Repetitions Other comments   Stretching     Hamstring 5x30    Hip Flexion     ITB     Grion     Quad     Inclined Calf 5x:30    Towel Pull          SLR     Supine 3x10 5#    Prone 3x10 5#    Abduction 3x10 5#    Adducton 3x10 5#         SLR+ ABD 5x:30    SLR+ 3x:20    Clams 3x10 10#                   Isometrics     Quad sets     Hip Adduction     Hamstring                    Patellar Glides     Medial     Superior     Inferior          ROM     Sheet Pulls 10x:10    Wall Slides     Edge of bed     Flexionator     Extensionator     Hang Weights     Ankle Pumps                              CKC     Calf raises     Wall sits 3x:20 Hold NV   Step ups     Step up and over     Lateral Step Downs               Mini squats     CC TKE          Lateral band walks     Side Planks     Half moon     Single leg flow          Biodex-balance   Motor Control- 2xL3 medium (1 with HHA. 1 without HHA)    Single leg stance     Plyoback          Stool scoots      bridge SL SB HS Curls     Planks          PRE     Extension  RANGE: 90/40   Flexion 3x10 25# SL RANGE: 0/90        Cable Column          Leg Press 3x10 50# ECL  RANGE: 70/10        Bike 8' L1    Treadmill          Cone walks  No AD               Spoke with Dr. Corey Braga regarding the use of BFR with patient. Bloodflow restriction was utilized throughout exercise session with use of Lacey Unit for a period of 8 minutes at 165 mmHg mmHg. The Cuff was deflated every 10 minutes for reperfusion during treatment. The pateint was monitored for parathesia as well as poor tolerance throughout the treatment session. BFR Smart Phase Protocol                    BFR Session 1                       1 min rest period between each set of repetitions. 2 Min rest/reperfusion between    each exercises protocol perfromed                      BFR Locations  BFR set at: 186  for 80%mmHg  Blue cuff proximal thigh  233 LOP.   Set to 165 today Protocol: 30/15/15/15           Exercises:   Reps 1 min Reps 1 min Reps 1 min Reps Notes                Quad Sets # of reps required  1:30 of :10 on/:10 off Rest 1:30 of :10 on/:10 off Rest 1:30 of :10 on/:10 off Rest 1:30 of :10 on/:10 off     completed  1:30 of :10 on/:10 off  1:30 of :10 on/:10 off  1:30 of :10 on/:10 off  1:30 of :10 on/:10 off    Leg press isometric NV? reps required  30  15  15  15     completed             reps required  30  15  15  15     completed             reps required  30  15  15  15     completed                    Therapeutic Exercise and NMR EXR  [] (87204) Provided verbal/tactile cueing for activities related to strengthening, flexibility, endurance, ROM for improvements in LE, proximal hip, and core control with self care, mobility, lifting, ambulation.  [] (30877) Provided verbal/tactile cueing for activities related to improving balance, coordination, kinesthetic sense, posture, motor skill, proprioception  to assist with LE, proximal hip, and core control in self care, mobility, lifting, ambulation and eccentric single leg control.      NMR and Therapeutic Activities:    [] (22382 or 88769) Provided verbal/tactile cueing for activities related to improving balance, coordination, kinesthetic sense, posture, motor skill, proprioception and motor activation to allow for proper function of core, proximal hip and LE with self care and ADLs  [] (51841) Gait Re-education- Provided training and instruction to the patient for proper LE, core and proximal hip recruitment and positioning and eccentric body weight control with ambulation re-education including up and down stairs     Home Exercise Program:    [x] (01627) Reviewed/Progressed HEP activities related to strengthening, flexibility, endurance, ROM of core, proximal hip and LE for functional self-care, mobility, lifting and ambulation/stair navigation   [] (13370)Reviewed/Progressed HEP activities related to improving balance, coordination, kinesthetic sense, posture, motor skill, proprioception of core, proximal hip and LE for self care, mobility, lifting, and ambulation/stair navigation      Manual Treatments:  PROM / STM / Oscillations-Mobs:  G-I, II, III, IV (PA's, Inf., Post.)  [] (74067) Provided manual therapy to mobilize LE, proximal hip and/or LS spine soft tissue/joints for the purpose of modulating pain, promoting relaxation,  increasing ROM, reducing/eliminating soft tissue swelling/inflammation/restriction, improving soft tissue extensibility and allowing for proper ROM for normal function with self care, mobility, lifting and ambulation. Modalities:  15' ice    Charges:   Timed Code Treatment Minutes: 61'   Total Treatment Minutes: 79'     [] EVAL (LOW) 455 1011   [] EVAL (MOD) 37051   [] EVAL (HIGH) 67401   [] RE-EVAL   [x] DK(59316) x 2    [] IONTO  [x] NMR (67504) x 1    [] VASO  [] Manual (55030) x      [] Other:  [x] TA x  1    [] Mech Traction (30922)  [] ES(attended) (04913)      [] ES (un) (95951): Access Code: [email or web address]  URL: [email or web address]  Date: 11/29/2021  Prepared by: Columba Llanes    Exercises  Standing Gastroc Stretch - 2 x daily - 7 x weekly - 5 reps - 30 hold  Seated Table Hamstring Stretch - 2 x daily - 7 x weekly - 1 sets - 5 reps - 30 hold  Long Sitting Calf Stretch with Strap - 2 x daily - 7 x weekly - 1 sets - 5 reps - 30 hold  Supine Heel Slide with Strap - 1 x daily - 7 x weekly - 3 sets - 10 reps  Straight Leg Raise - 2 x daily - 7 x weekly - 3 sets - 10 reps  Sidelying Hip Abduction - 2 x daily - 7 x weekly - 3 sets - 10 reps  Prone Hip Extension - 2 x daily - 7 x weekly - 3 sets - 10 reps  Sidelying Hip Adduction - 2 x daily - 7 x weekly - 3 sets - 10 reps  Clamshell with Resistance - 1 x daily - 3 x weekly - 3 sets - 10 reps  Standing Single Leg Heel Raise - 1 x daily - 3 x weekly - 3 sets - 10 reps  Single Leg Bridge - 1 x daily - 3 x weekly - 3 sets - 10 reps  Single Leg Stance - 1 x daily - 3 x weekly - 5 sets - 30 hold          GOALS:  Patient stated goal: return to walking without pain, return to golfing   [x] Progressing: [] Met: [] Not Met: [] Adjusted    Therapist goals for Patient:   Short Term Goals: To be achieved in: 2 weeks  1. Independent in HEP and progression per patient tolerance, in order to prevent re-injury. [] Progressing: [x] Met: [] Not Met: [] Adjusted   2. Patient will have a decrease in pain to facilitate improvement in movement, function, and ADLs as indicated by Functional Deficits. [x] Progressing: [] Met: [] Not Met: [] Adjusted    Long Term Goals: To be achieved in: 6-8 weeks  1. Disability index score of 20% or less for the LEFS to assist with reaching prior level of function. [x] Progressing: [] Met: [] Not Met: [] Adjusted  2. Patient will demonstrate increased AROM to 120+ flexion, 0+ extension  to allow for proper joint functioning as indicated by patients Functional Deficits. [] Progressing: [x] Met: [] Not Met: [] Adjusted  3. Patient will demonstrate an increase in Strength to 4/5 or greater  to allow for proper functional mobility as indicated by patients Functional Deficits. [x] Progressing: [] Met: [] Not Met: [] Adjusted  4. Patient will return to ambulation on all surfaces without increased symptoms or restriction. [x] Progressing: [] Met: [] Not Met: [] Adjusted  5. Patient will return to navigating stairs without increased symptoms or restriction. [x] Progressing: [] Met: [] Not Met: [] Adjusted        Overall Progression Towards Functional goals/ Treatment Progress Update:  [] Patient is progressing as expected towards functional goals listed. [] Progression is slowed due to complexities/Impairments listed. [] Progression has been slowed due to co-morbidities.   [x] Plan just implemented, too soon to assess goals progression <30days   [] Goals require adjustment due to lack of progress  [] Patient is not progressing as expected and requires additional follow up with physician  [] Other    Prognosis for POC: [x] Good [] Fair  [] Poor      Patient requires continued skilled intervention: [x] Yes  [] No    Treatment/Activity Tolerance:  [x] Patient able to complete treatment  [] Patient limited by fatigue  [] Patient limited by pain     [] Patient limited by other medical complications  [] Other: Pt shan tx fair/well. He reported pain after the leg press isometrics. He demo pain with leg press, but this was improved with decreasing/modifying his ROM. Pt shan BFR fair/well. We can plan on progressing this next visit. Pt req cuing t/o tx to slow down and for proper form on SLR, particularly for flex to keep knee fully ext. Pt was seen by MD today. He will start an antiinflammatory, and I've asked him to continue icing it. Pt would continue to benefit from skilled PT to improve strength, ROM, pain, and function. Pt may wear compression to his desire. Recommend continuing tx 2x/wk with utilization of BFR x 6-8 wks with gradual reintroduction to running. Return to Play: (if applicable)   []  Stage 1: Intro to Strength   []  Stage 2: Return to Run and Strength   []  Stage 3: Return to Jump and Strength   []  Stage 4: Dynamic Strength and Agility   []  Stage 5: Sport Specific Training     []  Ready to Return to Play, Meets All Above Stages   []  Not Ready for Return to Sport   Comments:                               PLAN:  Progress to more WB activities as listed in flowsheet. Monitor knee ROM on leg press to allow for painfree exercise. Consider BFR with leg press isometric. [x] Continue per plan of care [] Alter current plan (see comments above)  [] Plan of care initiated [] Hold pending MD visit [] Discharge  Note: If patient does not return for scheduled/ recommended follow up visits, this note will serve as a discharge from care along with most recent update on progress.         Electronically signed by:   Elvin Gómez Brad, [place], [place] [id number]

## 2021-12-14 ENCOUNTER — HOSPITAL ENCOUNTER (OUTPATIENT)
Dept: PHYSICAL THERAPY | Age: 46
Setting detail: THERAPIES SERIES
Discharge: HOME | End: 2021-12-14
Payer: COMMERCIAL

## 2021-12-14 PROCEDURE — 97112 NEUROMUSCULAR REEDUCATION: CPT | Performed by: PHYSICAL THERAPIST

## 2021-12-14 PROCEDURE — 97110 THERAPEUTIC EXERCISES: CPT | Performed by: PHYSICAL THERAPIST

## 2021-12-15 ENCOUNTER — CLINICAL DOCUMENTATION (OUTPATIENT)
Dept: OTHER | Age: 46
End: 2021-12-15

## 2021-12-16 ENCOUNTER — APPOINTMENT (OUTPATIENT)
Dept: PHYSICAL THERAPY | Age: 46
End: 2021-12-16
Payer: COMMERCIAL

## 2021-12-17 ENCOUNTER — HOSPITAL ENCOUNTER (OUTPATIENT)
Dept: PHYSICAL THERAPY | Age: 46
Setting detail: THERAPIES SERIES
Discharge: HOME | End: 2021-12-17
Payer: COMMERCIAL

## 2021-12-17 PROCEDURE — 97112 NEUROMUSCULAR REEDUCATION: CPT | Performed by: PHYSICAL THERAPIST

## 2021-12-17 PROCEDURE — 97530 THERAPEUTIC ACTIVITIES: CPT | Performed by: PHYSICAL THERAPIST

## 2021-12-17 PROCEDURE — 97110 THERAPEUTIC EXERCISES: CPT | Performed by: PHYSICAL THERAPIST

## 2021-12-17 NOTE — FLOWSHEET NOTE
100 [facility] a Department of [address]  [place] [address], [address]  Office: [phone]  Fax:  [phone]                                                           Physical Therapy Daily Treatment Note  Date:  2021    Patient Name:  Amie Arroyo    :  1975  MRN: [id number]    Medical/Treatment Diagnosis Information:  · Diagnosis: M76.51 (ICD-10-CM) - Patellar tendinitis of right knee; sp plica removal and patellar debridement 11/10  · Treatment Diagnosis: M25.561 R knee pain  Insurance/Certification information:  PT Insurance Information: UMR  Physician Information:  Referring Practitioner: Hali Hanson  Has the plan of care been signed (Y/N):        []  Yes  [x]  No     Date of Patient follow up with Physician:       Is this a Progress Report:     []  Yes  [x]  No        Progress report will be due (10 Rx or 30 days whichever is less):        Recertification will be due (POC Duration  / 90 days whichever is less):          Visit # Insurance Allowable Auth Required   12 45 through 21-3/31/22 []  Yes []  No        Functional Scale: LEFs 61.25%     Date assessed:  12/10/21      Latex Allergy:  [x]NO      []YES  Preferred Language for Healthcare:   [x]English       []other:    Pain level:  0/10     SUBJECTIVE:  He continues to have problems with going up steps. Down isn't as bad. He was sore for about 1 hr after tx last visit. He was fine after that. His knee sometimes doesn't feel right, and he shakes it. Then it feels better.      OBJECTIVE: 12/10/21    Observation:     Test measurements:       Effusion  RIGHT LEFT   10cm superior to patella     Midline patella     10cm inferior to patella       Flexibility L R Comment   Hamstring   SLR   Gastroc      ITB      Quad                ROM PROM AROM Overpressure Comment    L R L R L R    Flexion    140      Extension    0 Strength L R Comment   Quad      Hamstring      Gastroc      Hip flexor      Hip ABD                         RESTRICTIONS/PRECAUTIONS: 0-90 motion for 2 weeks then full motion. WBAT    Exercises/Interventions:   Exercises:  Exercise/Equipment Resistance/Repetitions Other comments   Stretching     Hamstring 5x30    Hip Flexion     ITB     Grion     Quad     Inclined Calf 5x:30    Towel Pull          SLR     Supine    Prone    Abduction    Adducton        SLR+ ABD    SLR+    Clams                Isometrics    Quad sets    Hip Adduction    Hamstring                Patellar Glides    Medial    Superior    Inferior        ROM    Sheet Pulls    Wall Slides    Edge of bed    Flexionator    Extensionator    Hang Weights    Ankle Pumps                        CKC    Calf raises    Wall sits Hold NV   Step ups    Step up and over    Lateral Step Downs            Mini squats    CC TKE        Lateral band walks    Side Planks    Half moon    Single leg flow        Biodex-balance    Single leg stance    Plyoback        Stool scoots     bridge SL SB HS Curls    Planks        PRE    Extension RANGE: 90/40   Flexion RANGE: 0/90       Cable Column        Leg Press RANGE: 70/10       Bike 8' L1   Treadmill          Cone walks                 Spoke with Dr. Darrick Ibrahim regarding the use of BFR with patient. Bloodflow restriction was utilized throughout exercise session with use of Lacey Unit for a period of 8 minutes at 190 mmHg mmHg. The Cuff was deflated every 10 minutes for reperfusion during treatment. The pateint was monitored for parathesia as well as poor tolerance throughout the treatment session. BFR Smart Phase Protocol                    BFR Session 2                       1 min rest period between each set of repetitions.   2 Min rest/reperfusion between    each exercises protocol perfromed                      BFR Locations  BFR set at: 195 for 80%mmHg- pressure decreased to 140  Blue cuff proximal thigh  244 LOP.  Set to 165 today                Protocol: 30/15/15/15           Exercises:   Reps 1 min Reps 1 min Reps 1 min Reps Notes          QS  QS    SLR flex # of reps required  30x Rest 15x Rest 15x Rest 15x     completed  30x  15x  15x  15x    Leg press isometric  reps required  1:30 of :10 on/:10 off  1:30 of :10 on/:10 off  1:30 of :10 on/:10 off  1:30 of :10 on/:10 off     completed  1:30 of :10 on/:10 off  1:30 of :10 on/:10 off  1:30 of :10 on/:10 off  1:30 of :10 on/:10 off    Hip ABD SLR reps required  30  15  15  15     completed  30x  15x  15x  15x     reps required  30  15  15  15     completed                    Therapeutic Exercise and NMR EXR  [] (86445) Provided verbal/tactile cueing for activities related to strengthening, flexibility, endurance, ROM for improvements in LE, proximal hip, and core control with self care, mobility, lifting, ambulation.  [] (88694) Provided verbal/tactile cueing for activities related to improving balance, coordination, kinesthetic sense, posture, motor skill, proprioception  to assist with LE, proximal hip, and core control in self care, mobility, lifting, ambulation and eccentric single leg control.      NMR and Therapeutic Activities:    [] (89068 or 35990) Provided verbal/tactile cueing for activities related to improving balance, coordination, kinesthetic sense, posture, motor skill, proprioception and motor activation to allow for proper function of core, proximal hip and LE with self care and ADLs  [] (77681) Gait Re-education- Provided training and instruction to the patient for proper LE, core and proximal hip recruitment and positioning and eccentric body weight control with ambulation re-education including up and down stairs     Home Exercise Program:    [x] (26729) Reviewed/Progressed HEP activities related to strengthening, flexibility, endurance, ROM of core, proximal hip and LE for functional self-care, mobility, lifting and ambulation/stair navigation   [] (87924)Reviewed/Progressed HEP activities related to improving balance, coordination, kinesthetic sense, posture, motor skill, proprioception of core, proximal hip and LE for self care, mobility, lifting, and ambulation/stair navigation      Manual Treatments:  PROM / STM / Oscillations-Mobs:  G-I, II, III, IV (PA's, Inf., Post.)  [] (87920) Provided manual therapy to mobilize LE, proximal hip and/or LS spine soft tissue/joints for the purpose of modulating pain, promoting relaxation,  increasing ROM, reducing/eliminating soft tissue swelling/inflammation/restriction, improving soft tissue extensibility and allowing for proper ROM for normal function with self care, mobility, lifting and ambulation. Modalities:  15' ice    Charges:   Timed Code Treatment Minutes: 40'   Total Treatment Minutes: 61'     [] EVAL (LOW) 16723   [] EVAL (MOD) 01383   [] EVAL (HIGH) 86318   [] RE-EVAL   [x] IM(87822) x 1    [] IONTO  [x] NMR (57559) x 1    [] VASO  [] Manual (51845) x      [] Other:  [x] TA x 1    [] Mech Traction (99989)  [] ES(attended) (53274)      [] ES (un) (04207): Access Code: [email or web address]  URL: [email or web address]  Date: 11/29/2021  Prepared by: Melo Real Cessmackenzie    Exercises  Standing Gastroc Stretch - 2 x daily - 7 x weekly - 5 reps - 30 hold  Seated Table Hamstring Stretch - 2 x daily - 7 x weekly - 1 sets - 5 reps - 30 hold  Long Sitting Calf Stretch with Strap - 2 x daily - 7 x weekly - 1 sets - 5 reps - 30 hold  Supine Heel Slide with Strap - 1 x daily - 7 x weekly - 3 sets - 10 reps  Straight Leg Raise - 2 x daily - 7 x weekly - 3 sets - 10 reps  Sidelying Hip Abduction - 2 x daily - 7 x weekly - 3 sets - 10 reps  Prone Hip Extension - 2 x daily - 7 x weekly - 3 sets - 10 reps  Sidelying Hip Adduction - 2 x daily - 7 x weekly - 3 sets - 10 reps  Clamshell with Resistance - 1 x daily - 3 x weekly - 3 sets - 10 reps  Standing Single Leg Heel Raise - 1 x daily - 3 x weekly - 3 sets - 10 reps  Single Leg Bridge - 1 x daily - 3 x weekly - 3 sets - 10 reps  Single Leg Stance - 1 x daily - 3 x weekly - 5 sets - 30 hold          GOALS:  Patient stated goal: return to walking without pain, return to golfing   [x] Progressing: [] Met: [] Not Met: [] Adjusted    Therapist goals for Patient:   Short Term Goals: To be achieved in: 2 weeks  1. Independent in HEP and progression per patient tolerance, in order to prevent re-injury. [] Progressing: [x] Met: [] Not Met: [] Adjusted   2. Patient will have a decrease in pain to facilitate improvement in movement, function, and ADLs as indicated by Functional Deficits. [x] Progressing: [] Met: [] Not Met: [] Adjusted    Long Term Goals: To be achieved in: 6-8 weeks  1. Disability index score of 20% or less for the LEFS to assist with reaching prior level of function. [x] Progressing: [] Met: [] Not Met: [] Adjusted  2. Patient will demonstrate increased AROM to 120+ flexion, 0+ extension  to allow for proper joint functioning as indicated by patients Functional Deficits. [] Progressing: [x] Met: [] Not Met: [] Adjusted  3. Patient will demonstrate an increase in Strength to 4/5 or greater  to allow for proper functional mobility as indicated by patients Functional Deficits. [x] Progressing: [] Met: [] Not Met: [] Adjusted  4. Patient will return to ambulation on all surfaces without increased symptoms or restriction. [x] Progressing: [] Met: [] Not Met: [] Adjusted  5. Patient will return to navigating stairs without increased symptoms or restriction. [x] Progressing: [] Met: [] Not Met: [] Adjusted        Overall Progression Towards Functional goals/ Treatment Progress Update:  [] Patient is progressing as expected towards functional goals listed. [] Progression is slowed due to complexities/Impairments listed. [] Progression has been slowed due to co-morbidities.   [x] Plan just implemented, too soon to assess goals progression <30days   [] Goals require adjustment due to lack of progress  [] Patient is not progressing as expected and requires additional follow up with physician  [] Other    Prognosis for POC: [x] Good [] Fair  [] Poor      Patient requires continued skilled intervention: [x] Yes  [] No    Treatment/Activity Tolerance:  [x] Patient able to complete treatment  [] Patient limited by fatigue  [] Patient limited by pain     [] Patient limited by other medical complications  [] Other: Pt shan tx well. He shan BFR better today. His pain does appear to be improving. He has been compliant with his HEP. We did discuss bowling, which I said he could do modified (ie not big lunge and do it more upright). He was able to do SLR t/o with BFR, which was a big improvement. Also the pressure didn't have to be decreased. Pt would continue to benefit from skilled PT to improve strength, ROM, pain, and function. Pt may wear compression to his desire. Return to Play: (if applicable)   []  Stage 1: Intro to Strength   []  Stage 2: Return to Run and Strength   []  Stage 3: Return to Jump and Strength   []  Stage 4: Dynamic Strength and Agility   []  Stage 5: Sport Specific Training     []  Ready to Return to Play, Meets All Above Stages   []  Not Ready for Return to Sport   Comments:                               PLAN:  Progress to more WB activities as listed in flowsheet. Monitor knee ROM on leg press to allow for painfree exercise. Take measurements. [x] Continue per plan of care [] Alter current plan (see comments above)  [] Plan of care initiated [] Hold pending MD visit [] Discharge  Note: If patient does not return for scheduled/ recommended follow up visits, this note will serve as a discharge from care along with most recent update on progress.         Electronically signed by:   Bernard Wilks, [address] [facility], DPT [id number]

## 2021-12-20 ENCOUNTER — HOSPITAL ENCOUNTER (OUTPATIENT)
Dept: PHYSICAL THERAPY | Age: 46
Setting detail: THERAPIES SERIES
Discharge: HOME | End: 2021-12-20
Payer: COMMERCIAL

## 2021-12-20 PROCEDURE — 97110 THERAPEUTIC EXERCISES: CPT | Performed by: PHYSICAL THERAPIST

## 2021-12-20 PROCEDURE — 97016 VASOPNEUMATIC DEVICE THERAPY: CPT | Performed by: PHYSICAL THERAPIST

## 2021-12-20 PROCEDURE — 97530 THERAPEUTIC ACTIVITIES: CPT | Performed by: PHYSICAL THERAPIST

## 2021-12-20 PROCEDURE — 97112 NEUROMUSCULAR REEDUCATION: CPT | Performed by: PHYSICAL THERAPIST

## 2021-12-20 NOTE — FLOWSHEET NOTE
100 [facility] Performance and Rehabilitation a Department of [address], [address] [place]  Office: [phone]  Fax:  [phone]                                                           Physical Therapy Daily Treatment Note  Date:  2021    Patient Name:  Francheska Juárez    :  1975  MRN: [id number]    Medical/Treatment Diagnosis Information:  · Diagnosis: M76.51 (ICD-10-CM) - Patellar tendinitis of right knee; sp plica removal and patellar debridement 11/10  · Treatment Diagnosis: M25.561 R knee pain  Insurance/Certification information:  PT Insurance Information: UMR  Physician Information:  Referring Practitioner: Avelina Gray  Has the plan of care been signed (Y/N):        []  Yes  [x]  No     Date of Patient follow up with Physician:       Is this a Progress Report:     []  Yes  [x]  No        Progress report will be due (10 Rx or 30 days whichever is less):        Recertification will be due (POC Duration  / 90 days whichever is less):          Visit # Insurance Allowable Auth Required   13 45 through 21-3/31/22 []  Yes []  No        Functional Scale: LEFs 61.25%     Date assessed:  12/10/21      Latex Allergy:  [x]NO      []YES  Preferred Language for Healthcare:   [x]English       []other:    Pain level:  0/10     SUBJECTIVE:  He did fall at home this weekend. He was in socks and leaning against the counter. He was standing on wood floor when his foot slipped out from under him. It was his right leg. He went to catch himself with his left. He had significant pain after that. He had pain of about 4/10 with stairs. Before this, he did the elliptical for 20' without c/o pain.      OBJECTIVE: 21    Observation:     Test measurements:       Effusion  RIGHT LEFT   10cm superior to patella     Midline patella     10cm inferior to patella       Flexibility L R Comment   Hamstring   SLR   Gastroc      ITB exercises protocol perfromed                      BFR Locations  BFR set at: 211 for 80%mmHg- pressure decreased to 165  (65%)  Blue cuff proximal thigh  264 LOP. Set to 165 today                Protocol: 30/15/15/15           Exercises:   Reps 1 min Reps 1 min Reps 1 min Reps Notes        QS  QS  QS    SLR flex # of reps required  30x Rest 1:30 of :10 on/:10 off Rest 1:30 of :10 on/:10 off Rest 1:30 of :10 on/:10 off     completed  30x  1:30 of :10 on/:10 off  1:30 of :10 on/:10 off  1:30 of :10 on/:10 off    Leg press isometric  reps required  1:30 of :10 on/:10 off  1:30 of :10 on/:10 off  1:30 of :10 on/:10 off  1:30 of :10 on/:10 off 170 mmHg on this only    completed  1:30 of :10 on/:10 off  1:30 of :10 on/:10 off  1:30 of :10 on/:10 off  1:30 of :10 on/:10 off    Hip ABD SLR reps required  30  15  15  15     completed  30x  15x  15x  15x     reps required  30  15  15  15     completed                    Therapeutic Exercise and NMR EXR  [] (20509) Provided verbal/tactile cueing for activities related to strengthening, flexibility, endurance, ROM for improvements in LE, proximal hip, and core control with self care, mobility, lifting, ambulation.  [] (09281) Provided verbal/tactile cueing for activities related to improving balance, coordination, kinesthetic sense, posture, motor skill, proprioception  to assist with LE, proximal hip, and core control in self care, mobility, lifting, ambulation and eccentric single leg control.      NMR and Therapeutic Activities:    [] (49278 or 05711) Provided verbal/tactile cueing for activities related to improving balance, coordination, kinesthetic sense, posture, motor skill, proprioception and motor activation to allow for proper function of core, proximal hip and LE with self care and ADLs  [] (12847) Gait Re-education- Provided training and instruction to the patient for proper LE, core and proximal hip recruitment and positioning and eccentric body weight control with ambulation re-education including up and down stairs     Home Exercise Program:    [x] (12251) Reviewed/Progressed HEP activities related to strengthening, flexibility, endurance, ROM of core, proximal hip and LE for functional self-care, mobility, lifting and ambulation/stair navigation   [] (48663)Reviewed/Progressed HEP activities related to improving balance, coordination, kinesthetic sense, posture, motor skill, proprioception of core, proximal hip and LE for self care, mobility, lifting, and ambulation/stair navigation      Manual Treatments:  PROM / STM / Oscillations-Mobs:  G-I, II, III, IV (PA's, Inf., Post.)  [] (48799) Provided manual therapy to mobilize LE, proximal hip and/or LS spine soft tissue/joints for the purpose of modulating pain, promoting relaxation,  increasing ROM, reducing/eliminating soft tissue swelling/inflammation/restriction, improving soft tissue extensibility and allowing for proper ROM for normal function with self care, mobility, lifting and ambulation. Modalities:  15' vaso medium pressure    Charges:   Timed Code Treatment Minutes: 39'   Total Treatment Minutes: 76'     [] EVAL (LOW) 455 1011   [] EVAL (MOD) 71680   [] EVAL (HIGH) 33781   [] RE-EVAL   [x] AE(19681) x 1    [] IONTO  [x] NMR (44994) x 1    [x] VASO  [] Manual (06871) x      [] Other:  [x] TA x 1    [] Mech Traction (32707)  [] ES(attended) (19447)      [] ES (un) (31797): Access Code: [email or web address]  URL: [email or web address]  Date: 11/29/2021  Prepared by: Columba Llanes    Exercises  Standing Gastroc Stretch - 2 x daily - 7 x weekly - 5 reps - 30 hold  Seated Table Hamstring Stretch - 2 x daily - 7 x weekly - 1 sets - 5 reps - 30 hold  Long Sitting Calf Stretch with Strap - 2 x daily - 7 x weekly - 1 sets - 5 reps - 30 hold  Supine Heel Slide with Strap - 1 x daily - 7 x weekly - 3 sets - 10 reps  Straight Leg Raise - 2 x daily - 7 x weekly - 3 sets - 10 reps  Sidelying Hip Abduction - 2 x daily - 7 x weekly - 3 sets - 10 reps  Prone Hip Extension - 2 x daily - 7 x weekly - 3 sets - 10 reps  Sidelying Hip Adduction - 2 x daily - 7 x weekly - 3 sets - 10 reps  Clamshell with Resistance - 1 x daily - 3 x weekly - 3 sets - 10 reps  Standing Single Leg Heel Raise - 1 x daily - 3 x weekly - 3 sets - 10 reps  Single Leg Bridge - 1 x daily - 3 x weekly - 3 sets - 10 reps  Single Leg Stance - 1 x daily - 3 x weekly - 5 sets - 30 hold          GOALS:  Patient stated goal: return to walking without pain, return to golfing   [x] Progressing: [] Met: [] Not Met: [] Adjusted    Therapist goals for Patient:   Short Term Goals: To be achieved in: 2 weeks  1. Independent in HEP and progression per patient tolerance, in order to prevent re-injury. [] Progressing: [x] Met: [] Not Met: [] Adjusted   2. Patient will have a decrease in pain to facilitate improvement in movement, function, and ADLs as indicated by Functional Deficits. [x] Progressing: [] Met: [] Not Met: [] Adjusted    Long Term Goals: To be achieved in: 6-8 weeks  1. Disability index score of 20% or less for the LEFS to assist with reaching prior level of function. [x] Progressing: [] Met: [] Not Met: [] Adjusted  2. Patient will demonstrate increased AROM to 120+ flexion, 0+ extension  to allow for proper joint functioning as indicated by patients Functional Deficits. [] Progressing: [x] Met: [] Not Met: [] Adjusted  3. Patient will demonstrate an increase in Strength to 4/5 or greater  to allow for proper functional mobility as indicated by patients Functional Deficits. [x] Progressing: [] Met: [] Not Met: [] Adjusted  4. Patient will return to ambulation on all surfaces without increased symptoms or restriction. [x] Progressing: [] Met: [] Not Met: [] Adjusted  5. Patient will return to navigating stairs without increased symptoms or restriction.    [x] Progressing: [] Met: [] Not Met: [] Adjusted        Overall Progression Towards Functional goals/ Treatment Progress Update:  [] Patient is progressing as expected towards functional goals listed. [] Progression is slowed due to complexities/Impairments listed. [] Progression has been slowed due to co-morbidities. [x] Plan just implemented, too soon to assess goals progression <30days   [] Goals require adjustment due to lack of progress  [] Patient is not progressing as expected and requires additional follow up with physician  [] Other    Prognosis for POC: [x] Good [] Fair  [] Poor      Patient requires continued skilled intervention: [x] Yes  [] No    Treatment/Activity Tolerance:  [x] Patient able to complete treatment  [] Patient limited by fatigue  [] Patient limited by pain     [] Patient limited by other medical complications  [] Other: Pt shan tx fair/well. We did modify his routine due to his recent slip/fall. The pressure was decreased on BFR for SLR due to fatigue in hip flexor. We did trial a chopat strap with prewrap. This was fine particularly with step up on L1. We discussed using his compression sleeve for swelling, ice, and he is continuing to take his anti inflammatory. Due to pt's pain/soreness, we did modify tx. Pt was ed on getting chopat strap if the pre wrap is helpful. Pt would continue to benefit from skilled PT to improve strength, ROM, pain, and function. Pt may wear compression to his desire. Return to Play: (if applicable)   []  Stage 1: Intro to Strength   []  Stage 2: Return to Run and Strength   []  Stage 3: Return to Jump and Strength   []  Stage 4: Dynamic Strength and Agility   []  Stage 5: Sport Specific Training     []  Ready to Return to Play, Meets All Above Stages   []  Not Ready for Return to Sport   Comments:                               PLAN:  Progress to more WB activities as listed in flowsheet. Consider brace for pain.     [x] Continue per plan of care [] Alter current plan (see comments above)  [] Plan of care initiated [] Hold pending MD visit [] Discharge  Note: If patient does not return for scheduled/ recommended follow up visits, this note will serve as a discharge from care along with most recent update on progress.         Electronically signed by:   Rocky Joya, [address] [facility], DPT [id number]

## 2021-12-22 ENCOUNTER — APPOINTMENT (OUTPATIENT)
Dept: PHYSICAL THERAPY | Age: 46
End: 2021-12-22
Payer: COMMERCIAL

## 2021-12-23 ENCOUNTER — HOSPITAL ENCOUNTER (OUTPATIENT)
Dept: PHYSICAL THERAPY | Age: 46
Setting detail: THERAPIES SERIES
Discharge: HOME | End: 2021-12-23
Payer: COMMERCIAL

## 2021-12-23 PROCEDURE — 97110 THERAPEUTIC EXERCISES: CPT | Performed by: PHYSICAL THERAPIST

## 2021-12-23 PROCEDURE — 97140 MANUAL THERAPY 1/> REGIONS: CPT | Performed by: PHYSICAL THERAPIST

## 2021-12-23 PROCEDURE — 97016 VASOPNEUMATIC DEVICE THERAPY: CPT | Performed by: PHYSICAL THERAPIST

## 2021-12-23 PROCEDURE — 97530 THERAPEUTIC ACTIVITIES: CPT | Performed by: PHYSICAL THERAPIST

## 2021-12-23 NOTE — FLOWSHEET NOTE
100 [facility] Performance and Rehabilitation a Department of [address]  [facility] [place] [address], [address] [place]  Office: [phone]  Fax:  [phone]                                                           Physical Therapy Daily Treatment Note  Date:  2021    Patient Name:  Tristian Rome    :  1975  MRN: [id number]    Medical/Treatment Diagnosis Information:  · Diagnosis: M76.51 (ICD-10-CM) - Patellar tendinitis of right knee; sp plica removal and patellar debridement 11/10  · Treatment Diagnosis: M25.561 R knee pain  Insurance/Certification information:  PT Insurance Information: UMR  Physician Information:  Referring Practitioner: Grant Benjamin  Has the plan of care been signed (Y/N):        []  Yes  [x]  No     Date of Patient follow up with Physician:       Is this a Progress Report:     []  Yes  [x]  No        Progress report will be due (10 Rx or 30 days whichever is less):        Recertification will be due (POC Duration  / 90 days whichever is less):          Visit # Insurance Allowable Auth Required   14 45 through 21-3/31/22 []  Yes []  No        Functional Scale: LEFs 61.25%     Date assessed:  12/10/21      Latex Allergy:  [x]NO      []YES  Preferred Language for Healthcare:   [x]English       []other:    Pain level:  0/10     SUBJECTIVE:  He has pain of 3/10 with stairs. He feels he continues to have swelling. He was able to do the elliptical before he slipped though. He wants to be able to play golf and run. He did get a chopat strap, but he doesn't think it helps. He feels the compression of the compression sleeve is most beneficial.        OBJECTIVE:   21-TTP over proximal and distal patellar tendon. No S&S of infection. Skin not red.       21    Observation:     Test measurements:       Effusion  RIGHT LEFT   10cm superior to patella     Midline patella     10cm inferior to patella       Flexibility L R Comment   Hamstring   SLR   Gastroc      ITB      Quad                ROM PROM AROM Overpressure Comment    L R L R L R    Flexion    136      Extension                                  Strength L R Comment   Quad      Hamstring      Gastroc      Hip flexor      Hip ABD                         RESTRICTIONS/PRECAUTIONS: 0-90 motion for 2 weeks then full motion. WBAT    Exercises/Interventions:   Exercises:  Exercise/Equipment Resistance/Repetitions Other comments   Stretching     Hamstring 5x30    Hip Flexion     ITB     Grion     Quad 3x:30    Inclined Calf 5x:30    Towel Pull          SLR     Supine    Prone    Abduction    Adducton        SLR+ ABD    SLR+    Clams                Isometrics    Quad sets    Hip Adduction    Hamstring                Patellar Glides    Medial    Superior    Inferior        ROM    Sheet Pulls    Wall Slides    Edge of bed    Flexionator    Extensionator    Hang Weights    Ankle Pumps                        CKC    Calf raises    Wall sits    Step ups    Step up and over    Lateral Step Downs            Mini squats    CC TKE        Lateral band walks    Side Planks    Half moon    Single leg flow        Biodex-balance    Single leg stance    Plyoback        Stool scoots     bridge SL SB HS Curls    Planks        PRE    Extension RANGE: 90/40   Flexion RANGE: 0/90       Cable Column        Leg Press RANGE: 70/10       Bike 5' L1   Treadmill          Cone walks          Manual Gentle cross friction massage on patellar tendon-8'                 Spoke with Dr. Alejo Reyna regarding the use of BFR with patient. Bloodflow restriction was utilized throughout exercise session with use of Lacey Unit for a period of 8 minutes at 165 mmHg mmHg. The Cuff was deflated every 10 minutes for reperfusion during treatment. The pateint was monitored for parathesia as well as poor tolerance throughout the treatment session.        BFR Smart Phase Protocol                    BFR Session 4 1 min rest period between each set of repetitions. 2 Min rest/reperfusion between    each exercises protocol perfromed                      BFR Locations  BFR set at:   (65%)  Blue cuff proximal thigh   . Set to 165 today Unable to get LOP today               Protocol: 30/15/15/15           Exercises:   Reps 1 min Reps 1 min Reps 1 min Reps Notes                SLR flex # of reps required  30x Rest 15x Rest 15x Rest 15x     completed  30x  15x  15x  15x    Leg press  reps required  30x  15x  15x  15x     completed  30x90  15x120#  15x120#  15x140#    Hip ABD SLR reps required  30  15  15  15     completed  30x  15x  15x  15x     reps required  30  15  15  15     completed                    Therapeutic Exercise and NMR EXR  [] (43133) Provided verbal/tactile cueing for activities related to strengthening, flexibility, endurance, ROM for improvements in LE, proximal hip, and core control with self care, mobility, lifting, ambulation.  [] (02152) Provided verbal/tactile cueing for activities related to improving balance, coordination, kinesthetic sense, posture, motor skill, proprioception  to assist with LE, proximal hip, and core control in self care, mobility, lifting, ambulation and eccentric single leg control.      NMR and Therapeutic Activities:    [] (44268 or 40447) Provided verbal/tactile cueing for activities related to improving balance, coordination, kinesthetic sense, posture, motor skill, proprioception and motor activation to allow for proper function of core, proximal hip and LE with self care and ADLs  [] (88364) Gait Re-education- Provided training and instruction to the patient for proper LE, core and proximal hip recruitment and positioning and eccentric body weight control with ambulation re-education including up and down stairs     Home Exercise Program:    [x] (75408) Reviewed/Progressed HEP activities related to strengthening, flexibility, endurance, ROM of core, proximal hip and LE for functional self-care, mobility, lifting and ambulation/stair navigation   [] (05478)Reviewed/Progressed HEP activities related to improving balance, coordination, kinesthetic sense, posture, motor skill, proprioception of core, proximal hip and LE for self care, mobility, lifting, and ambulation/stair navigation      Manual Treatments:  PROM / STM / Oscillations-Mobs:  G-I, II, III, IV (PA's, Inf., Post.)  [] (17960) Provided manual therapy to mobilize LE, proximal hip and/or LS spine soft tissue/joints for the purpose of modulating pain, promoting relaxation,  increasing ROM, reducing/eliminating soft tissue swelling/inflammation/restriction, improving soft tissue extensibility and allowing for proper ROM for normal function with self care, mobility, lifting and ambulation. Modalities:  15' vaso medium pressure    Charges:   Timed Code Treatment Minutes: 39'   Total Treatment Minutes: 76'     [] EVAL (LOW) 455 1011   [] EVAL (MOD) 87521   [] EVAL (HIGH) 03050   [] RE-EVAL   [x] YW(98843) x 1    [] IONTO  [] NMR (56071) x     [x] VASO  [x] Manual (57106) x1      [] Other:  [x] TA x 1    [] Mech Traction (65164)  [] ES(attended) (58833)      [] ES (un) (59451): Access Code: [email or web address]  URL: [email or web address]  Date: 11/29/2021  Prepared by: Nirmala Llanes    Exercises  Standing Gastroc Stretch - 2 x daily - 7 x weekly - 5 reps - 30 hold  Seated Table Hamstring Stretch - 2 x daily - 7 x weekly - 1 sets - 5 reps - 30 hold  Long Sitting Calf Stretch with Strap - 2 x daily - 7 x weekly - 1 sets - 5 reps - 30 hold  Supine Heel Slide with Strap - 1 x daily - 7 x weekly - 3 sets - 10 reps  Straight Leg Raise - 2 x daily - 7 x weekly - 3 sets - 10 reps  Sidelying Hip Abduction - 2 x daily - 7 x weekly - 3 sets - 10 reps  Prone Hip Extension - 2 x daily - 7 x weekly - 3 sets - 10 reps  Sidelying Hip Adduction - 2 x daily - 7 x weekly - 3 sets - 10 reps  Clamshell with Resistance - 1 x daily - 3 x weekly - 3 sets - 10 reps  Standing Single Leg Heel Raise - 1 x daily - 3 x weekly - 3 sets - 10 reps  Single Leg Bridge - 1 x daily - 3 x weekly - 3 sets - 10 reps  Single Leg Stance - 1 x daily - 3 x weekly - 5 sets - 30 hold          GOALS:  Patient stated goal: return to walking without pain, return to golfing   [x] Progressing: [] Met: [] Not Met: [] Adjusted    Therapist goals for Patient:   Short Term Goals: To be achieved in: 2 weeks  1. Independent in HEP and progression per patient tolerance, in order to prevent re-injury. [] Progressing: [x] Met: [] Not Met: [] Adjusted   2. Patient will have a decrease in pain to facilitate improvement in movement, function, and ADLs as indicated by Functional Deficits. [x] Progressing: [] Met: [] Not Met: [] Adjusted    Long Term Goals: To be achieved in: 6-8 weeks  1. Disability index score of 20% or less for the LEFS to assist with reaching prior level of function. [x] Progressing: [] Met: [] Not Met: [] Adjusted  2. Patient will demonstrate increased AROM to 120+ flexion, 0+ extension  to allow for proper joint functioning as indicated by patients Functional Deficits. [] Progressing: [x] Met: [] Not Met: [] Adjusted  3. Patient will demonstrate an increase in Strength to 4/5 or greater  to allow for proper functional mobility as indicated by patients Functional Deficits. [x] Progressing: [] Met: [] Not Met: [] Adjusted  4. Patient will return to ambulation on all surfaces without increased symptoms or restriction. [x] Progressing: [] Met: [] Not Met: [] Adjusted  5. Patient will return to navigating stairs without increased symptoms or restriction. [x] Progressing: [] Met: [] Not Met: [] Adjusted        Overall Progression Towards Functional goals/ Treatment Progress Update:  [] Patient is progressing as expected towards functional goals listed. [] Progression is slowed due to complexities/Impairments listed.   [] Progression has been slowed due to co-morbidities. [x] Plan just implemented, too soon to assess goals progression <30days   [] Goals require adjustment due to lack of progress  [] Patient is not progressing as expected and requires additional follow up with physician  [] Other    Prognosis for POC: [x] Good [] Fair  [] Poor      Patient requires continued skilled intervention: [x] Yes  [] No    Treatment/Activity Tolerance:  [x] Patient able to complete treatment  [] Patient limited by fatigue  [] Patient limited by pain     [] Patient limited by other medical complications  [] Other: Pt shan tx fair/well. He continues to demo some swelling and was tender over his patellar tendon today. Thus, we talked about ice massage and cross friction massage. We did trial the GenuTrain, but he didn't like it or feel it was helpful. The Chopat strap didn't decrease his pain either. I did cut him more compression sleeves. We did discuss getting an OTC compression brace. We discussed ice massage as well. Pt will be out of town for a week. He plans on using the compression while he is on the trip. He can call me or reach out with any c/o. Pt would continue to benefit from skilled PT to improve strength, ROM, pain, and function. Return to Play: (if applicable)   []  Stage 1: Intro to Strength   []  Stage 2: Return to Run and Strength   []  Stage 3: Return to Jump and Strength   []  Stage 4: Dynamic Strength and Agility   []  Stage 5: Sport Specific Training     []  Ready to Return to Play, Meets All Above Stages   []  Not Ready for Return to Sport   Comments:                               PLAN:  Progress to more WB activities as listed in flowsheet.      [x] Continue per plan of care [] Alter current plan (see comments above)  [] Plan of care initiated [] Hold pending MD visit [] Discharge  Note: If patient does not return for scheduled/ recommended follow up visits, this note will serve as a discharge from care along with most recent update on progress.         Electronically signed by:   Ananda Cerda, DPT [id number]

## 2022-01-04 ENCOUNTER — APPOINTMENT (OUTPATIENT)
Dept: PHYSICAL THERAPY | Age: 47
End: 2022-01-04
Payer: COMMERCIAL

## 2022-01-06 ENCOUNTER — APPOINTMENT (OUTPATIENT)
Dept: PHYSICAL THERAPY | Age: 47
End: 2022-01-06
Payer: COMMERCIAL

## 2022-01-11 ENCOUNTER — OFFICE VISIT (OUTPATIENT)
Dept: ORTHOPEDIC SURGERY | Age: 47
End: 2022-01-11

## 2022-01-11 ENCOUNTER — HOSPITAL ENCOUNTER (OUTPATIENT)
Dept: PHYSICAL THERAPY | Age: 47
Setting detail: THERAPIES SERIES
Discharge: HOME | End: 2022-01-11
Payer: COMMERCIAL

## 2022-01-11 VITALS — HEIGHT: 74 IN | BODY MASS INDEX: 29.77 KG/M2 | WEIGHT: 232 LBS

## 2022-01-11 DIAGNOSIS — M76.51 PATELLAR TENDINITIS OF RIGHT KNEE: ICD-10-CM

## 2022-01-11 DIAGNOSIS — M25.561 ACUTE PAIN OF RIGHT KNEE: Primary | ICD-10-CM

## 2022-01-11 PROCEDURE — 99024 POSTOP FOLLOW-UP VISIT: CPT | Performed by: ORTHOPAEDIC SURGERY

## 2022-01-11 PROCEDURE — 97112 NEUROMUSCULAR REEDUCATION: CPT | Performed by: PHYSICAL THERAPIST

## 2022-01-11 PROCEDURE — 97110 THERAPEUTIC EXERCISES: CPT | Performed by: PHYSICAL THERAPIST

## 2022-01-11 PROCEDURE — 97530 THERAPEUTIC ACTIVITIES: CPT | Performed by: PHYSICAL THERAPIST

## 2022-01-11 RX ORDER — METHYLPREDNISOLONE 4 MG/1
TABLET ORAL
Qty: 1 KIT | Refills: 0 | Status: SHIPPED | OUTPATIENT
Start: 2022-01-11 | End: 2022-01-25 | Stop reason: ALTCHOICE

## 2022-01-11 NOTE — PLAN OF CARE
100 [facility] Performance and Rehabilitation a Department of [address]  [address] [address] [place] [address], [address]  Office: [phone]  Fax:  [phone]     Physical Therapy Re-Certification Plan of Care    Dear Dr. Radha Lenz,    We had the pleasure of treating the following patient for physical therapy services at [address] [place] [address]. A summary of our findings can be found in the updated assessment below. This includes our plan of care. If you have any questions or concerns regarding these findings, please do not hesitate to contact me at the office phone number checked above. Thank you for the referral.     Physician Signature:________________________________Date:__________________  By signing above (or electronic signature), therapists plan is approved by physician    Date Range Of Visits: 11/11/21-1/11/2022  Total Visits to Date: 13  Overall Response to Treatment:   [] Patient is responding well to treatment and improvement is noted with regards to goals   [] Patient should continue to improve in reasonable time if they continue HEP   [] Patient has plateaued and is no longer responding to skilled PT intervention    [] Patient is getting worse and would benefit from return to referring MD   [] Patient unable to adhere to initial POC   [x] Other: Pt shan tx fair/well. He could feel a little pressure on the bike downstroke. He did shan BFR leg press well and without c/o pain. This is improving. He did wear his genutrain brace t/o tx. We discussed holding on the elliptical and other ways to workout that would be safer for him currently like lifting upper body and walking. Pt will hold on running as well. He will start the [address] as prescribed by the MD.  We will plan on progressing his strength. He has unfortunately been quarantining due to Covid and out of town the week before that. Overall, he is progressing gradually.   Pt would continue to benefit from skilled PT to improve strength, flexibility, pain, function, and return to PLOF. Recommend continuing tx 1-2x/wk x 4-8 wks. Physical Therapy Daily Treatment Note  Date:  2022    Patient Name:  Colin Troy    :  1975  MRN: [id number]    Medical/Treatment Diagnosis Information:  · Diagnosis: M76.51 (ICD-10-CM) - Patellar tendinitis of right knee; sp plica removal and patellar debridement 11/10  · Treatment Diagnosis: M25.561 R knee pain  Insurance/Certification information:  PT Insurance Information: UMR  Physician Information:  Referring Practitioner: Skylar Rose  Has the plan of care been signed (Y/N):        []  Yes  [x]  No     Date of Patient follow up with Physician:       Is this a Progress Report:     [x]  Yes  []  No        Progress report will be due (10 Rx or 30 days whichever is less):        Recertification will be due (POC Duration  / 90 days whichever is less):          Visit # Insurance Allowable Auth Required   15 45 through 21-3/31/22 []  Yes []  No        Functional Scale: LEFs 63.75%     Date assessed:  22      Latex Allergy:  [x]NO      []YES  Preferred Language for Healthcare:   [x]English       []other:    Pain level:  0/10     SUBJECTIVE:  It was hurting. He tried to run for about 4' but that hurt. He has been able to do the elliptical, but this was a bit sore. He walked a lot on vacation, and he wore the sleeve the whole time. Up/down steps bothers him. The MD saw him before coming in today, and he thought it looked swollen. He does feel better with something on it. Pt reports his pain at worst is 2-3/10. Pt was out of town for a trip, and when he returned he had a scratchy throat. He did a rapid COVID test, and it was positive. He quarantined, and he is feeling much better now.          OBJECTIVE:    22    Observation:     Test measurements:       Effusion  RIGHT LEFT   10cm superior to patella     Midline patella     10cm inferior to patella       Flexibility L R Comment   Hamstring   SLR   Gastroc      ITB      Quad                ROM PROM AROM Overpressure Comment    L R L R L R    Flexion    145      Extension    0                              Strength L R Comment   Quad  4- pain    Hamstring  4+    Gastroc      Hip flexor  4    Hip ABD  4- to 4                       RESTRICTIONS/PRECAUTIONS:     Exercises/Interventions:   Exercises:  Exercise/Equipment Resistance/Repetitions Other comments   Stretching     Hamstring 5x30    Hip Flexion     ITB     Grion     Quad 5x:30    Inclined Calf 5x:30    Towel Pull          SLR     Supine    Prone    Abduction    Adducton        SLR+ ABD    SLR+ NV? Clams 3x10 10#               Isometrics    Quad sets    Hip Adduction    Hamstring                Patellar Glides    Medial    Superior    Inferior        ROM    Sheet Pulls    Wall Slides    Edge of bed    Flexionator    Extensionator    Hang Weights    Ankle Pumps                        CKC    Calf raises    Wall sits    Step ups    Step up and over    Lateral Step Downs            Mini squats    CC TKE        Lateral band walks    Side Planks    Half moon    Single leg flow        Biodex-balance    Single leg stance 5x:20 Airex   Plyoback        Stool scoots     bridge SL SB HS Curls    Planks        PRE    Extension RANGE: 90/40   Flexion 3x10 25-32# SLRANGE: 0/90       Cable Column        Leg Press RANGE: 70/10       Bike 5' L1   Treadmill          Cone walks          Manual                 Spoke with Dr. Chapis Grimaldo regarding the use of BFR with patient. Bloodflow restriction was utilized throughout exercise session with use of Lacey Unit for a period of 8 minutes at 170 mmHg mmHg. The Cuff was deflated every 10 minutes for reperfusion during treatment. The pateint was monitored for parathesia as well as poor tolerance throughout the treatment session.        BFR Smart Phase Protocol                    BFR Session 5                       1 min rest period between each set of repetitions. 2 Min rest/reperfusion between    each exercises protocol perfromed                      BFR Locations  BFR set at: 200 for 80%mmHg- pressure decreased to 170  (70%)  Blue cuff proximal thigh   250 LOP. Protocol: 30/15/15/15           Exercises:   Reps 1 min Reps 1 min Reps 1 min Reps Notes                SLR flex # of reps required  30x Rest 15x Rest 15x Rest 15x     completed  30x  1:30 of :30 on/:30 off  1:30 of :30 on/:30 off  1:30 of :30 on/:30 off Changed due to pulling in groin that wasn't relieved with decrease pressure   Leg press  reps required  30x  15x  15x  15x     completed  30x140  15x145#  15x150#  15x140#    Hip ABD SLR reps required  30  15  15  15     completed  30x  15x  15x  15x     reps required  30  15  15  15     completed                    Therapeutic Exercise and NMR EXR  [] (54169) Provided verbal/tactile cueing for activities related to strengthening, flexibility, endurance, ROM for improvements in LE, proximal hip, and core control with self care, mobility, lifting, ambulation.  [] (36388) Provided verbal/tactile cueing for activities related to improving balance, coordination, kinesthetic sense, posture, motor skill, proprioception  to assist with LE, proximal hip, and core control in self care, mobility, lifting, ambulation and eccentric single leg control.      NMR and Therapeutic Activities:    [] (68195 or 30568) Provided verbal/tactile cueing for activities related to improving balance, coordination, kinesthetic sense, posture, motor skill, proprioception and motor activation to allow for proper function of core, proximal hip and LE with self care and ADLs  [] (35897) Gait Re-education- Provided training and instruction to the patient for proper LE, core and proximal hip recruitment and positioning and eccentric body weight control with ambulation re-education including up and down stairs     Home Exercise Program:    [x] (94191) Reviewed/Progressed HEP activities related to strengthening, flexibility, endurance, ROM of core, proximal hip and LE for functional self-care, mobility, lifting and ambulation/stair navigation   [] (96051)Reviewed/Progressed HEP activities related to improving balance, coordination, kinesthetic sense, posture, motor skill, proprioception of core, proximal hip and LE for self care, mobility, lifting, and ambulation/stair navigation      Manual Treatments:  PROM / STM / Oscillations-Mobs:  G-I, II, III, IV (PA's, Inf., Post.)  [] (49443) Provided manual therapy to mobilize LE, proximal hip and/or LS spine soft tissue/joints for the purpose of modulating pain, promoting relaxation,  increasing ROM, reducing/eliminating soft tissue swelling/inflammation/restriction, improving soft tissue extensibility and allowing for proper ROM for normal function with self care, mobility, lifting and ambulation. Modalities:  Declined-pt to do at home today    Charges:   Timed Code Treatment Minutes: 37'   Total Treatment Minutes: 61'     [] EVAL (LOW) 43290   [] EVAL (MOD) 87476   [] EVAL (HIGH) 08515   [] RE-EVAL   [x] IY(87271) x 1    [] IONTO  [x] NMR (73338) x 1    [] VASO  [] Manual (62399) x      [] Other:  [x] TA x 1    [] Mech Traction (69090)  [] ES(attended) (29675)      [] ES (un) (35575): Access Code: [email or web address]  URL: [email or web address]  Date: 11/29/2021  Prepared by: Fransisco Llanes    Exercises  Standing Gastroc Stretch - 2 x daily - 7 x weekly - 5 reps - 30 hold  Seated Table Hamstring Stretch - 2 x daily - 7 x weekly - 1 sets - 5 reps - 30 hold  Long Sitting Calf Stretch with Strap - 2 x daily - 7 x weekly - 1 sets - 5 reps - 30 hold  Supine Heel Slide with Strap - 1 x daily - 7 x weekly - 3 sets - 10 reps  Straight Leg Raise - 2 x daily - 7 x weekly - 3 sets - 10 reps  Sidelying Hip Abduction - 2 x daily - 7 x goals/ Treatment Progress Update:  [x] Patient is progressing as expected towards functional goals listed. [] Progression is slowed due to complexities/Impairments listed. [] Progression has been slowed due to co-morbidities. [] Plan just implemented, too soon to assess goals progression <30days   [] Goals require adjustment due to lack of progress  [] Patient is not progressing as expected and requires additional follow up with physician  [] Other    Prognosis for POC: [x] Good [] Fair  [] Poor      Patient requires continued skilled intervention: [x] Yes  [] No    Treatment/Activity Tolerance:  [x] Patient able to complete treatment  [] Patient limited by fatigue  [] Patient limited by pain     [] Patient limited by other medical complications  [] Other: Pt shan tx fair/well. He could feel a little pressure on the bike downstroke. He did shan BFR leg press well and without c/o pain. This is improving. He did wear his genutrain brace t/o tx. We discussed holding on the elliptical and other ways to workout that would be safer for him currently like lifting upper body and walking. Pt will hold on running as well. He will start the [address] as prescribed by the MD.  We will plan on progressing his strength. He has unfortunately been quarantining due to Covid and out of town the week before that. Overall, he is progressing gradually. Pt would continue to benefit from skilled PT to improve strength, flexibility, pain, function, and return to PLOF. Recommend continuing tx 1-2x/wk x 4-8 wks.          Return to Play: (if applicable)   []  Stage 1: Intro to Strength   []  Stage 2: Return to Run and Strength   []  Stage 3: Return to Jump and Strength   []  Stage 4: Dynamic Strength and Agility   []  Stage 5: Sport Specific Training     []  Ready to Return to Play, Meets All Above Stages   []  Not Ready for Return to Sport   Comments:                               PLAN:  Progress to more WB activities as listed in flowsheet. Consider knee ext ECL or with BFR. [x] Continue per plan of care [] Alter current plan (see comments above)  [] Plan of care initiated [] Hold pending MD visit [] Discharge  Note: If patient does not return for scheduled/ recommended follow up visits, this note will serve as a discharge from care along with most recent update on progress.         Electronically signed by:   Marylou Verdin, [facility], DPT [id number]

## 2022-01-11 NOTE — PROGRESS NOTES
Alejandra Maldonado returns today 2 months status post right knee arthroscopy and patellar tendon debridement. In general he is making good progress but has had some setbacks with a couple issues. One issue was a slip and fall where he extended his knee. The second was after trying to go jogging. Currently says using the elliptical does not cause pain. He is very nervous about a golf trip that comes up in a month. Today, there is some fullness about the patella tendon incision but no meño swelling. Some mild irritability over the fat pad. No tenderness at the inferior pole of the patella. He has good quad tone and full range of motion. Were going to try out a Genutrain knee brace for couple weeks and see if that works. I also prescribed a Medrol Dosepak to calm down any residual fat pad swelling. Follow-up with me in a month. He is clearly not ready to be running just 2 months after surgery.

## 2022-01-14 ENCOUNTER — HOSPITAL ENCOUNTER (OUTPATIENT)
Dept: PHYSICAL THERAPY | Age: 47
Setting detail: THERAPIES SERIES
Discharge: HOME | End: 2022-01-14
Payer: COMMERCIAL

## 2022-01-14 PROCEDURE — 97530 THERAPEUTIC ACTIVITIES: CPT | Performed by: PHYSICAL THERAPIST

## 2022-01-14 PROCEDURE — 97110 THERAPEUTIC EXERCISES: CPT | Performed by: PHYSICAL THERAPIST

## 2022-01-14 PROCEDURE — 97112 NEUROMUSCULAR REEDUCATION: CPT | Performed by: PHYSICAL THERAPIST

## 2022-01-14 NOTE — FLOWSHEET NOTE
[facility] a Department of [address]  [place] [address], [address]  Office: [phone]  Fax:  [phone]     Physical Therapy Daily Treatment Note  Date:  2022    Patient Name:  Colin Troy    :  1975  MRN: [id number]    Medical/Treatment Diagnosis Information:  · Diagnosis: M76.51 (ICD-10-CM) - Patellar tendinitis of right knee; sp plica removal and patellar debridement 11/10  · Treatment Diagnosis: M25.561 R knee pain  Insurance/Certification information:  PT Insurance Information: UMR  Physician Information:  Referring Practitioner: Skylar Rose  Has the plan of care been signed (Y/N):        []  Yes  [x]  No     Date of Patient follow up with Physician:       Is this a Progress Report:     [x]  Yes  []  No        Progress report will be due (10 Rx or 30 days whichever is less):        Recertification will be due (POC Duration  / 90 days whichever is less):          Visit # Insurance Allowable Auth Required   16 45 through 21-3/31/22 []  Yes [x]  No        Functional Scale: LEFs 63.75%     Date assessed:  22      Latex Allergy:  [x]NO      []YES  Preferred Language for Healthcare:   [x]English       []other:    Pain level:  0/10     SUBJECTIVE:  He feels like the medication has been helpful. He doesn't like the Genutrain brace. It's too bulky and big. He is thinking of getting a neoprene sleeve instead.            OBJECTIVE:    22    Observation:     Test measurements:       Effusion  RIGHT LEFT   10cm superior to patella     Midline patella     10cm inferior to patella       Flexibility L R Comment   Hamstring   SLR   Gastroc      ITB      Quad                ROM PROM AROM Overpressure Comment    L R L R L R    Flexion    145      Extension    0                              Strength L R Comment   Quad  4- pain    Hamstring  4+    Gastroc      Hip flexor  4    Hip ABD  4- to 4      RESTRICTIONS/PRECAUTIONS:     Exercises/Interventions:   Exercises:  Exercise/Equipment Resistance/Repetitions Other comments   Stretching     Hamstring 5x30    Hip Flexion     ITB     Grion     Quad 5x:30    Inclined Calf 5x:30    Towel Pull          SLR     Supine    Prone    Abduction    Adducton        SLR+ ABD    SLR+ NV? Clams 3x10 10#               Isometrics    Quad sets    Hip Adduction    Hamstring                Patellar Glides    Medial    Superior    Inferior        ROM    Sheet Pulls    Wall Slides    Edge of bed    Flexionator    Extensionator    Hang Weights    Ankle Pumps                        CKC    Calf raises    Wall sits 3x:20   Step ups    Step up and over    Lateral Step Downs            Mini squats    CC TKE        Lateral band walks    Side Planks    Half moon    Single leg flow        Biodex-balance    Single leg stance 5x:30 Airex   Plyoback        Stool scoots     bridge SL SB HS Curls    Planks        PRE    Extension RANGE: 90/40   Flexion RANGE: 0/90       Cable Column        Leg Press RANGE: 70/10       Bike 5' L1   Treadmill          Cone walks          Manual                 Spoke with Dr. Annamaria Juan regarding the use of BFR with patient. Bloodflow restriction was utilized throughout exercise session with use of Lacey Unit for a period of 8 minutes at 177 mmHg mmHg. The Cuff was deflated every 10 minutes for reperfusion during treatment. The pateint was monitored for parathesia as well as poor tolerance throughout the treatment session. BFR Smart Phase Protocol                    BFR Session 6                       1 min rest period between each set of repetitions. 2 Min rest/reperfusion between    each exercises protocol perfromed                      BFR Locations  BFR set at: 177 for 80%mmHg  Blue cuff proximal thigh   221 LOP.                   Protocol: 30/15/15/15           Exercises:   Reps 1 min Reps 1 min Reps 1 min Reps Notes SLR flex # of reps required  30x Rest 15x Rest 15x Rest 15x     completed  30x  15x  1:30 of :30 on/:30 off  1:30 of :30 on/:30 off Changed due to pulling in groin    Leg press  reps required  30x  15x  15x  15x performed extra    completed  30x150  15x150#  15x150#  15x150#    Knee ext reps required  30  15  15  15     completed  30x10#  15x25#  15x25#  15x25#            Therapeutic Exercise and NMR EXR  [] (41098) Provided verbal/tactile cueing for activities related to strengthening, flexibility, endurance, ROM for improvements in LE, proximal hip, and core control with self care, mobility, lifting, ambulation.  [] (29706) Provided verbal/tactile cueing for activities related to improving balance, coordination, kinesthetic sense, posture, motor skill, proprioception  to assist with LE, proximal hip, and core control in self care, mobility, lifting, ambulation and eccentric single leg control.      NMR and Therapeutic Activities:    [] (39259 or 71612) Provided verbal/tactile cueing for activities related to improving balance, coordination, kinesthetic sense, posture, motor skill, proprioception and motor activation to allow for proper function of core, proximal hip and LE with self care and ADLs  [] (19907) Gait Re-education- Provided training and instruction to the patient for proper LE, core and proximal hip recruitment and positioning and eccentric body weight control with ambulation re-education including up and down stairs     Home Exercise Program:    [x] (93212) Reviewed/Progressed HEP activities related to strengthening, flexibility, endurance, ROM of core, proximal hip and LE for functional self-care, mobility, lifting and ambulation/stair navigation   [] (91541)Reviewed/Progressed HEP activities related to improving balance, coordination, kinesthetic sense, posture, motor skill, proprioception of core, proximal hip and LE for self care, mobility, lifting, and ambulation/stair navigation      Manual Progressing: [] Met: [] Not Met: [] Adjusted    Therapist goals for Patient:   Short Term Goals: To be achieved in: 2 weeks  1. Independent in HEP and progression per patient tolerance, in order to prevent re-injury. [] Progressing: [x] Met: [] Not Met: [] Adjusted   2. Patient will have a decrease in pain to facilitate improvement in movement, function, and ADLs as indicated by Functional Deficits. [x] Progressing: [] Met: [] Not Met: [] Adjusted    Long Term Goals: To be achieved in: 6-8 weeks  1. Disability index score of 20% or less for the LEFS to assist with reaching prior level of function. [x] Progressing: [] Met: [] Not Met: [] Adjusted   2. Patient will demonstrate increased AROM to 120+ flexion, 0+ extension  to allow for proper joint functioning as indicated by patients Functional Deficits. [] Progressing: [x] Met: [] Not Met: [] Adjusted  3. Patient will demonstrate an increase in Strength to 4/5 or greater  to allow for proper functional mobility as indicated by patients Functional Deficits. [x] Progressing: [] Met: [] Not Met: [] Adjusted  4. Patient will return to ambulation on all surfaces without increased symptoms or restriction. [x] Progressing: [] Met: [] Not Met: [] Adjusted  5. Patient will return to navigating stairs without increased symptoms or restriction. [x] Progressing: [] Met: [] Not Met: [] Adjusted        Overall Progression Towards Functional goals/ Treatment Progress Update:  [x] Patient is progressing as expected towards functional goals listed. [] Progression is slowed due to complexities/Impairments listed. [] Progression has been slowed due to co-morbidities.   [] Plan just implemented, too soon to assess goals progression <30days   [] Goals require adjustment due to lack of progress  [] Patient is not progressing as expected and requires additional follow up with physician  [] Other    Prognosis for POC: [x] Good [] Fair  [] Poor      Patient requires continued skilled intervention: [x] Yes  [] No    Treatment/Activity Tolerance:  [x] Patient able to complete treatment  [] Patient limited by fatigue  [] Patient limited by pain     [] Patient limited by other medical complications  [] Other: Pt shan tx well. He did again have discomfort with SLR with BFR, and he shan quad sets though. This was again modified. Today he was able to shan wall sits and do knee ext without c/o pain, which is a significant improvement. He was challenged with BFR knee ext and was lifting all the weight with his contralateral leg at the end of tx. Pt would continue to benefit from skilled PT to improve strength, flexibility, pain, function, and return to PLOF. Return to Play: (if applicable)   []  Stage 1: Intro to Strength   []  Stage 2: Return to Run and Strength   []  Stage 3: Return to Jump and Strength   []  Stage 4: Dynamic Strength and Agility   []  Stage 5: Sport Specific Training     []  Ready to Return to Play, Meets All Above Stages   []  Not Ready for Return to Sport   Comments:                               PLAN:  Progress to more WB activities as listed in flowsheet. Consider hamstring curls again. [x] Continue per plan of care [] Alter current plan (see comments above)  [] Plan of care initiated [] Hold pending MD visit [] Discharge  Note: If patient does not return for scheduled/ recommended follow up visits, this note will serve as a discharge from care along with most recent update on progress.         Electronically signed by:   Enio Sosa, [address] [facility], DPT [id number]

## 2022-01-18 ENCOUNTER — HOSPITAL ENCOUNTER (OUTPATIENT)
Dept: PHYSICAL THERAPY | Age: 47
Setting detail: THERAPIES SERIES
Discharge: HOME | End: 2022-01-18
Payer: COMMERCIAL

## 2022-01-18 PROCEDURE — 97530 THERAPEUTIC ACTIVITIES: CPT | Performed by: PHYSICAL THERAPIST

## 2022-01-18 PROCEDURE — 97110 THERAPEUTIC EXERCISES: CPT | Performed by: PHYSICAL THERAPIST

## 2022-01-18 NOTE — FLOWSHEET NOTE
[facility] a Department of [address]  [place] [address], [address] [address]  Office: [phone]  Fax:  [phone]     Physical Therapy Daily Treatment Note  Date:  2022    Patient Name:  Colin Troy    :  1975  MRN: [id number]    Medical/Treatment Diagnosis Information:  · Diagnosis: M76.51 (ICD-10-CM) - Patellar tendinitis of right knee; sp plica removal and patellar debridement 11/10  · Treatment Diagnosis: M25.561 R knee pain  Insurance/Certification information:  PT Insurance Information: UMR  Physician Information:  Referring Practitioner: Skylar Rose  Has the plan of care been signed (Y/N):        []  Yes  [x]  No     Date of Patient follow up with Physician:       Is this a Progress Report:     [x]  Yes  []  No        Progress report will be due (10 Rx or 30 days whichever is less):        Recertification will be due (POC Duration  / 90 days whichever is less):          Visit # Insurance Allowable Auth Required   17 45 through 21-3/31/22 []  Yes [x]  No        Functional Scale: LEFs 63.75%     Date assessed:  22      Latex Allergy:  [x]NO      []YES  Preferred Language for Healthcare:   [x]English       []other:    Pain level:  0/10     SUBJECTIVE:  He has been walking every day on the treadmill. He is doing 28-36' a day of brisk walking. It is feeling as good as it has. He is doing steps without c/o pain. He does need to leave in about 50'. He finished his medication yesterday.          OBJECTIVE:    22    Observation:     Test measurements:       Effusion  RIGHT LEFT   10cm superior to patella     Midline patella     10cm inferior to patella       Flexibility L R Comment   Hamstring   SLR   Gastroc      ITB      Quad                ROM PROM AROM Overpressure Comment    L R L R L R    Flexion    145      Extension    0                              Strength L R Comment   Quad  4- pain    Hamstring  4+    Gastroc      Hip flexor  4    Hip ABD  4- to 4                       RESTRICTIONS/PRECAUTIONS:     Exercises/Interventions:   Exercises:  Exercise/Equipment Resistance/Repetitions Other comments   Stretching     Hamstring 5x30    Hip Flexion     ITB     Grion     Quad 5x:30    Inclined Calf 5x:30    Towel Pull          SLR     Supine    Prone    Abduction    Adducton        SLR+ ABD    SLR+ NV? Clams                Isometrics    Quad sets    Hip Adduction    Hamstring                Patellar Glides    Medial    Superior    Inferior        ROM    Sheet Pulls    Wall Slides    Edge of bed    Flexionator    Extensionator    Hang Weights    Ankle Pumps                        CKC    Calf raises    Wall sits    Step ups    Step up and over    Lateral Step Downs            Mini squats    CC TKE        Lateral band walks    Side Planks    Half moon    Single leg flow        Biodex-balance    Single leg stance 5x:30 Airex   Plyoback        Stool scoots     bridge SL SB HS Curls    Planks        PRE    Extension RANGE: 90/40   Flexion RANGE: 0/90       Cable Column        Leg Press RANGE: 70/10       Bike 5' L1   Treadmill          Cone walks          Manual                 Spoke with Dr. Chapis Grimaldo regarding the use of BFR with patient. Bloodflow restriction was utilized throughout exercise session with use of Lacey Unit for a period of 8 minutes at 189 mmHg mmHg. The Cuff was deflated every 10 minutes for reperfusion during treatment. The pateint was monitored for parathesia as well as poor tolerance throughout the treatment session. BFR Smart Phase Protocol                    BFR Session 7                       1 min rest period between each set of repetitions. 2 Min rest/reperfusion between    each exercises protocol perfromed                      BFR Locations  BFR set at: 183 for 80%mmHg  Blue cuff proximal thigh    229 LOP.                   Protocol: 30/15/15/15           Exercises: Reps 1 min Reps 1 min Reps 1 min Reps Notes                SLR flex # of reps required  30x Rest 15x Rest 15x Rest 15x     completed  30x  15x  15x  1:30 of :30 on/:30 off Changed due to pulling in groin    Leg press BLE reps required  30x  15x  15x  15x Do SL NV    completed  30x150  15x150#  15x150#  15x150#    Knee ext reps required  30  15  15  15     completed  30x25#  15x25#  15x25#  15x25#            Therapeutic Exercise and NMR EXR  [] (27208) Provided verbal/tactile cueing for activities related to strengthening, flexibility, endurance, ROM for improvements in LE, proximal hip, and core control with self care, mobility, lifting, ambulation.  [] (01869) Provided verbal/tactile cueing for activities related to improving balance, coordination, kinesthetic sense, posture, motor skill, proprioception  to assist with LE, proximal hip, and core control in self care, mobility, lifting, ambulation and eccentric single leg control.      NMR and Therapeutic Activities:    [] (38993 or 70385) Provided verbal/tactile cueing for activities related to improving balance, coordination, kinesthetic sense, posture, motor skill, proprioception and motor activation to allow for proper function of core, proximal hip and LE with self care and ADLs  [] (34248) Gait Re-education- Provided training and instruction to the patient for proper LE, core and proximal hip recruitment and positioning and eccentric body weight control with ambulation re-education including up and down stairs     Home Exercise Program:    [x] (10436) Reviewed/Progressed HEP activities related to strengthening, flexibility, endurance, ROM of core, proximal hip and LE for functional self-care, mobility, lifting and ambulation/stair navigation   [] (56713)Reviewed/Progressed HEP activities related to improving balance, coordination, kinesthetic sense, posture, motor skill, proprioception of core, proximal hip and LE for self care, mobility, lifting, and ambulation/stair navigation      Manual Treatments:  PROM / STM / Oscillations-Mobs:  G-I, II, III, IV (PA's, Inf., Post.)  [] (61802) Provided manual therapy to mobilize LE, proximal hip and/or LS spine soft tissue/joints for the purpose of modulating pain, promoting relaxation,  increasing ROM, reducing/eliminating soft tissue swelling/inflammation/restriction, improving soft tissue extensibility and allowing for proper ROM for normal function with self care, mobility, lifting and ambulation. Modalities:  Declined    Charges:  Timed Code Treatment Minutes: 35'   Total Treatment Minutes: 48'     [] EVAL (LOW) [id number]   [] EVAL (MOD) 07183   [] EVAL (HIGH) 94448   [] RE-EVAL   [x] XX(27104) x 1    [] IONTO  [] NMR (45985) x    [] VASO  [] Manual (55173) x      [] Other:  [x] TA x 1    [] Mech Traction (53073)  [] ES(attended) (59862)      [] ES (un) (17871): Access Code: [email or web address]  URL: [email or web address]  Date: 11/29/2021  Prepared by: Carlos Llanes    Exercises  Standing Gastroc Stretch - 2 x daily - 7 x weekly - 5 reps - 30 hold  Seated Table Hamstring Stretch - 2 x daily - 7 x weekly - 1 sets - 5 reps - 30 hold  Long Sitting Calf Stretch with Strap - 2 x daily - 7 x weekly - 1 sets - 5 reps - 30 hold  Supine Heel Slide with Strap - 1 x daily - 7 x weekly - 3 sets - 10 reps  Straight Leg Raise - 2 x daily - 7 x weekly - 3 sets - 10 reps  Sidelying Hip Abduction - 2 x daily - 7 x weekly - 3 sets - 10 reps  Prone Hip Extension - 2 x daily - 7 x weekly - 3 sets - 10 reps  Sidelying Hip Adduction - 2 x daily - 7 x weekly - 3 sets - 10 reps  Clamshell with Resistance - 1 x daily - 3 x weekly - 3 sets - 10 reps  Standing Single Leg Heel Raise - 1 x daily - 3 x weekly - 3 sets - 10 reps  Single Leg Bridge - 1 x daily - 3 x weekly - 3 sets - 10 reps  Single Leg Stance - 1 x daily - 3 x weekly - 5 sets - 30 hold          GOALS:  Patient stated goal: return to walking without pain, return to golfing [x] Progressing: [] Met: [] Not Met: [] Adjusted    Therapist goals for Patient:   Short Term Goals: To be achieved in: 2 weeks  1. Independent in HEP and progression per patient tolerance, in order to prevent re-injury. [] Progressing: [x] Met: [] Not Met: [] Adjusted   2. Patient will have a decrease in pain to facilitate improvement in movement, function, and ADLs as indicated by Functional Deficits. [x] Progressing: [] Met: [] Not Met: [] Adjusted    Long Term Goals: To be achieved in: 6-8 weeks  1. Disability index score of 20% or less for the LEFS to assist with reaching prior level of function. [x] Progressing: [] Met: [] Not Met: [] Adjusted   2. Patient will demonstrate increased AROM to 120+ flexion, 0+ extension  to allow for proper joint functioning as indicated by patients Functional Deficits. [] Progressing: [x] Met: [] Not Met: [] Adjusted  3. Patient will demonstrate an increase in Strength to 4/5 or greater  to allow for proper functional mobility as indicated by patients Functional Deficits. [x] Progressing: [] Met: [] Not Met: [] Adjusted  4. Patient will return to ambulation on all surfaces without increased symptoms or restriction. [x] Progressing: [] Met: [] Not Met: [] Adjusted  5. Patient will return to navigating stairs without increased symptoms or restriction. [x] Progressing: [] Met: [] Not Met: [] Adjusted        Overall Progression Towards Functional goals/ Treatment Progress Update:  [x] Patient is progressing as expected towards functional goals listed. [] Progression is slowed due to complexities/Impairments listed. [] Progression has been slowed due to co-morbidities.   [] Plan just implemented, too soon to assess goals progression <30days   [] Goals require adjustment due to lack of progress  [] Patient is not progressing as expected and requires additional follow up with physician  [] Other    Prognosis for POC: [x] Good [] Fair  [] Poor      Patient requires continued skilled intervention: [x] Yes  [] No    Treatment/Activity Tolerance:  [x] Patient able to complete treatment  [] Patient limited by fatigue  [] Patient limited by pain     [] Patient limited by other medical complications  [] Other: Pt shan tx well. Pt's quad was very fatigued after BFR today. He has been able to progress PREs due to less pain. He has been reporting less pain as well, which is probably due to a combination of improved strength and finishing the medication prescribed by MD.  Pt would continue to benefit from skilled PT to improve strength, flexibility, pain, function, and return to PLOF. Return to Play: (if applicable)   []  Stage 1: Intro to Strength   []  Stage 2: Return to Run and Strength   []  Stage 3: Return to Jump and Strength   []  Stage 4: Dynamic Strength and Agility   []  Stage 5: Sport Specific Training     []  Ready to Return to Play, Meets All Above Stages   []  Not Ready for Return to Sport   Comments:                               PLAN:  Progress to more WB activities as listed in flowsheet. Consider hamstring curls again. Consider leg press SL with BFR. [x] Continue per plan of care [] Alter current plan (see comments above)  [] Plan of care initiated [] Hold pending MD visit [] Discharge  Note: If patient does not return for scheduled/ recommended follow up visits, this note will serve as a discharge from care along with most recent update on progress.         Electronically signed by:   Demond Escoto, [facility], DPT [id number]

## 2022-01-21 ENCOUNTER — HOSPITAL ENCOUNTER (OUTPATIENT)
Dept: PHYSICAL THERAPY | Age: 47
Setting detail: THERAPIES SERIES
Discharge: HOME | End: 2022-01-21
Payer: COMMERCIAL

## 2022-01-21 PROCEDURE — 97530 THERAPEUTIC ACTIVITIES: CPT | Performed by: PHYSICAL THERAPIST

## 2022-01-21 PROCEDURE — 97110 THERAPEUTIC EXERCISES: CPT | Performed by: PHYSICAL THERAPIST

## 2022-01-21 PROCEDURE — 97112 NEUROMUSCULAR REEDUCATION: CPT | Performed by: PHYSICAL THERAPIST

## 2022-01-21 NOTE — FLOWSHEET NOTE
flex # of reps required  30x Rest 15x Rest 15x Rest 15x     completed  30x  15x  7x 1:30 of :30 on/:30 off  1:30 of :30 on/:30 off Changed due to pulling in groin    Leg press SL reps required  30x  15x  15x  15x 15x    completed  30x50  15x60#  15x60#  15x60# 15x60#   Knee ext reps required  30  15  15  15 15x    completed  30x25#  15x25#  15x25#  15x25# 15x25#           Therapeutic Exercise and NMR EXR  [] (44349) Provided verbal/tactile cueing for activities related to strengthening, flexibility, endurance, ROM for improvements in LE, proximal hip, and core control with self care, mobility, lifting, ambulation.  [] (48878) Provided verbal/tactile cueing for activities related to improving balance, coordination, kinesthetic sense, posture, motor skill, proprioception  to assist with LE, proximal hip, and core control in self care, mobility, lifting, ambulation and eccentric single leg control.      NMR and Therapeutic Activities:    [] (75621 or 46983) Provided verbal/tactile cueing for activities related to improving balance, coordination, kinesthetic sense, posture, motor skill, proprioception and motor activation to allow for proper function of core, proximal hip and LE with self care and ADLs  [] (10620) Gait Re-education- Provided training and instruction to the patient for proper LE, core and proximal hip recruitment and positioning and eccentric body weight control with ambulation re-education including up and down stairs     Home Exercise Program:    [x] (18245) Reviewed/Progressed HEP activities related to strengthening, flexibility, endurance, ROM of core, proximal hip and LE for functional self-care, mobility, lifting and ambulation/stair navigation   [] (28589)Reviewed/Progressed HEP activities related to improving balance, coordination, kinesthetic sense, posture, motor skill, proprioception of core, proximal hip and LE for self care, mobility, lifting, and ambulation/stair navigation      Manual Treatments:  PROM / STM / Oscillations-Mobs:  G-I, II, III, IV (PA's, Inf., Post.)  [] (23343) Provided manual therapy to mobilize LE, proximal hip and/or LS spine soft tissue/joints for the purpose of modulating pain, promoting relaxation,  increasing ROM, reducing/eliminating soft tissue swelling/inflammation/restriction, improving soft tissue extensibility and allowing for proper ROM for normal function with self care, mobility, lifting and ambulation. Modalities:  Declined    Charges:   Timed Code Treatment Minutes: 47'   Total Treatment Minutes: 61'     [] EVAL (LOW) 455 1011   [] EVAL (MOD) 83947   [] EVAL (HIGH) 04850   [] RE-EVAL   [x] YN(71612) x 2    [] IONTO  [x] NMR (68346) x 1   [] VASO  [] Manual (98564) x      [] Other:  [x] TA x 1    [] Mech Traction (89816)  [] ES(attended) (14019)      [] ES (un) (79239): Access Code: [email or web address]  URL: [email or web address]  Date: 11/29/2021  Prepared by: Remington Llanes    Exercises  Standing Gastroc Stretch - 2 x daily - 7 x weekly - 5 reps - 30 hold  Seated Table Hamstring Stretch - 2 x daily - 7 x weekly - 1 sets - 5 reps - 30 hold  Long Sitting Calf Stretch with Strap - 2 x daily - 7 x weekly - 1 sets - 5 reps - 30 hold  Supine Heel Slide with Strap - 1 x daily - 7 x weekly - 3 sets - 10 reps  Straight Leg Raise - 2 x daily - 7 x weekly - 3 sets - 10 reps  Sidelying Hip Abduction - 2 x daily - 7 x weekly - 3 sets - 10 reps  Prone Hip Extension - 2 x daily - 7 x weekly - 3 sets - 10 reps  Sidelying Hip Adduction - 2 x daily - 7 x weekly - 3 sets - 10 reps  Clamshell with Resistance - 1 x daily - 3 x weekly - 3 sets - 10 reps  Standing Single Leg Heel Raise - 1 x daily - 3 x weekly - 3 sets - 10 reps  Single Leg Bridge - 1 x daily - 3 x weekly - 3 sets - 10 reps  Single Leg Stance - 1 x daily - 3 x weekly - 5 sets - 30 hold          GOALS:  Patient stated goal: return to walking without pain, return to golfing   [x] Progressing: [] Met: [] Not Met: [] Adjusted    Therapist goals for Patient:   Short Term Goals: To be achieved in: 2 weeks  1. Independent in HEP and progression per patient tolerance, in order to prevent re-injury. [] Progressing: [x] Met: [] Not Met: [] Adjusted   2. Patient will have a decrease in pain to facilitate improvement in movement, function, and ADLs as indicated by Functional Deficits. [x] Progressing: [] Met: [] Not Met: [] Adjusted    Long Term Goals: To be achieved in: 6-8 weeks  1. Disability index score of 20% or less for the LEFS to assist with reaching prior level of function. [x] Progressing: [] Met: [] Not Met: [] Adjusted   2. Patient will demonstrate increased AROM to 120+ flexion, 0+ extension  to allow for proper joint functioning as indicated by patients Functional Deficits. [] Progressing: [x] Met: [] Not Met: [] Adjusted  3. Patient will demonstrate an increase in Strength to 4/5 or greater  to allow for proper functional mobility as indicated by patients Functional Deficits. [x] Progressing: [] Met: [] Not Met: [] Adjusted  4. Patient will return to ambulation on all surfaces without increased symptoms or restriction. [x] Progressing: [] Met: [] Not Met: [] Adjusted  5. Patient will return to navigating stairs without increased symptoms or restriction. [x] Progressing: [] Met: [] Not Met: [] Adjusted        Overall Progression Towards Functional goals/ Treatment Progress Update:  [x] Patient is progressing as expected towards functional goals listed. [] Progression is slowed due to complexities/Impairments listed. [] Progression has been slowed due to co-morbidities.   [] Plan just implemented, too soon to assess goals progression <30days   [] Goals require adjustment due to lack of progress  [] Patient is not progressing as expected and requires additional follow up with physician  [] Other    Prognosis for POC: [x] Good [] Fair  [] Poor      Patient requires continued skilled intervention: [x] Yes  [] No    Treatment/Activity Tolerance:  [x] Patient able to complete treatment  [] Patient limited by fatigue  [] Patient limited by pain     [] Patient limited by other medical complications  [] Other: Pt shan tx well. He did have some pain with the wall sits that was improved when the angle of flexion in the knee was decreased. He was able to perform leg press and knee ext without c/o. He is progressing with his strength and pain despite having some recent increase in soreness. Pt would continue to benefit from skilled PT to improve strength, flexibility, pain, function, and return to PLOF. Return to Play: (if applicable)   []  Stage 1: Intro to Strength   []  Stage 2: Return to Run and Strength   []  Stage 3: Return to Jump and Strength   []  Stage 4: Dynamic Strength and Agility   []  Stage 5: Sport Specific Training     []  Ready to Return to Play, Meets All Above Stages   []  Not Ready for Return to Sport   Comments:                               PLAN:  Progress to more WB activities as listed in flowsheet. Consider hamstring curls again. Consider leg press SL with BFR. [x] Continue per plan of care [] Alter current plan (see comments above)  [] Plan of care initiated [] Hold pending MD visit [] Discharge  Note: If patient does not return for scheduled/ recommended follow up visits, this note will serve as a discharge from care along with most recent update on progress.         Electronically signed by:   Ananda Chi, DPT [id number]

## 2022-01-25 ENCOUNTER — OFFICE VISIT (OUTPATIENT)
Dept: ORTHOPEDIC SURGERY | Age: 47
End: 2022-01-25

## 2022-01-25 DIAGNOSIS — M76.51 PATELLAR TENDINITIS OF RIGHT KNEE: ICD-10-CM

## 2022-01-25 DIAGNOSIS — M25.561 ACUTE PAIN OF RIGHT KNEE: Primary | ICD-10-CM

## 2022-01-25 PROCEDURE — 99024 POSTOP FOLLOW-UP VISIT: CPT | Performed by: ORTHOPAEDIC SURGERY

## 2022-01-25 RX ORDER — METHYLPREDNISOLONE 4 MG/1
TABLET ORAL
Qty: 1 KIT | Refills: 0 | Status: CANCELLED | OUTPATIENT
Start: 2022-01-25

## 2022-01-25 RX ORDER — METHYLPREDNISOLONE 4 MG/1
TABLET ORAL
Qty: 1 KIT | Refills: 0 | Status: SHIPPED | OUTPATIENT
Start: 2022-01-25 | End: 2022-07-08 | Stop reason: SDUPTHER

## 2022-01-25 RX ORDER — DEXAMETHASONE SODIUM PHOSPHATE 4 MG/ML
4 INJECTION, SOLUTION INTRA-ARTICULAR; INTRALESIONAL; INTRAMUSCULAR; INTRAVENOUS; SOFT TISSUE DAILY
Qty: 12 ML | Refills: 0 | Status: SHIPPED | OUTPATIENT
Start: 2022-01-25 | End: 2022-10-14

## 2022-01-25 NOTE — PROGRESS NOTES
Génesis Dao returns again with recurrent discomfort in the right knee. He was pain-free last week on the Medrol Dosepak but has had more pain today. He is tender. As I examine him, his right knee is quite tender but there is no significant swelling. There is no warmth or erythema. We will use iontophoresis and repeat his Medrol Dosepak I will check him again in 2 weeks.

## 2022-01-28 ENCOUNTER — APPOINTMENT (OUTPATIENT)
Dept: PHYSICAL THERAPY | Age: 47
End: 2022-01-28
Payer: COMMERCIAL

## 2022-02-02 ENCOUNTER — HOSPITAL ENCOUNTER (OUTPATIENT)
Dept: PHYSICAL THERAPY | Age: 47
Setting detail: THERAPIES SERIES
Discharge: HOME | End: 2022-02-02
Payer: COMMERCIAL

## 2022-02-02 PROCEDURE — 97033 APP MDLTY 1+IONTPHRSIS EA 15: CPT | Performed by: PHYSICAL THERAPIST

## 2022-02-02 PROCEDURE — 97530 THERAPEUTIC ACTIVITIES: CPT | Performed by: PHYSICAL THERAPIST

## 2022-02-02 PROCEDURE — 97110 THERAPEUTIC EXERCISES: CPT | Performed by: PHYSICAL THERAPIST

## 2022-02-02 NOTE — FLOWSHEET NOTE
[facility] a Department of [address]  [address], [address]  Office: [phone]  Fax:  [phone]     Physical Therapy Daily Treatment Note  Date:  2022    Patient Name:  Elizabeth Raya    :  1975  MRN: [id number]    Medical/Treatment Diagnosis Information:  · Diagnosis: M76.51 (ICD-10-CM) - Patellar tendinitis of right knee; sp plica removal and patellar debridement 11/10  · Treatment Diagnosis: M25.561 R knee pain  Insurance/Certification information:  PT Insurance Information: UMR  Physician Information:  Referring Practitioner: Mayra Chaudhary  Has the plan of care been signed (Y/N):        []  Yes  [x]  No     Date of Patient follow up with Physician:       Is this a Progress Report:     [x]  Yes  []  No        Progress report will be due (10 Rx or 30 days whichever is less):        Recertification will be due (POC Duration  / 90 days whichever is less):          Visit # Insurance Allowable Auth Required    through 21-3/31/22 []  Yes [x]  No        Functional Scale: LEFs 63.75%     Date assessed:  22      Latex Allergy:  [x]NO      []YES  Preferred Language for Healthcare:   [x]English       []other:    Pain level:  0/10     SUBJECTIVE:  He feels a lot better. He needs to leave in about 40'. He is feeling pretty good. He walked 3 miles yesterday. He is wearing the brace. He has a couple more days left on the steroid.        OBJECTIVE:    22    Observation:  -TTP over mid patella and inferior pole of patella   Test measurements:       Effusion  RIGHT LEFT   10cm superior to patella     Midline patella     10cm inferior to patella       Flexibility L R Comment   Hamstring   SLR   Gastroc      ITB      Quad                ROM PROM AROM Overpressure Comment    L R L R L R    Flexion    143      Extension    0                              Strength L R Comment   Quad Hamstring      Gastroc      Hip flexor      Hip ABD                         RESTRICTIONS/PRECAUTIONS:     Exercises/Interventions:   Exercises:  Exercise/Equipment Resistance/Repetitions Other comments   Stretching     Hamstring 5x30    Hip Flexion 5x:30    ITB     Grion     Quad 5x:30    Inclined Calf 5x:30    Towel Pull          SLR     Supine 3x10 1#    Prone 3x10 5#    Abduction 3x10 5#    Adducton 3x10 5#        SLR+ ABD    SLR+ NV? Clams                Isometrics    Quad sets    Hip Adduction    Hamstring                Patellar Glides    Medial    Superior    Inferior        ROM    Sheet Pulls    Wall Slides    Edge of bed    Flexionator    Extensionator    Hang Weights    Ankle Pumps                        CKC    Calf raises    Wall sits    Step ups    Step up and over    Lateral Step Downs            Mini squats    CC TKE        Lateral band walks    Side Planks    Half moon    Single leg flow        Biodex-balance    Single leg stance 5x:30 Airex    Plyoback        Stool scoots     bridge SL SB HS Curls    Planks        PRE    Extension 3x10 25# ECLRANGE: 90/40   Flexion 3x10 32# SLRANGE: 0/90       Cable Column        Leg Press 3x10 90# ECL   RANGE: 70/10       Bike    Treadmill 3'         Cone walks          Manual                     Therapeutic Exercise and NMR EXR  [] (72566) Provided verbal/tactile cueing for activities related to strengthening, flexibility, endurance, ROM for improvements in LE, proximal hip, and core control with self care, mobility, lifting, ambulation.  [] (74143) Provided verbal/tactile cueing for activities related to improving balance, coordination, kinesthetic sense, posture, motor skill, proprioception  to assist with LE, proximal hip, and core control in self care, mobility, lifting, ambulation and eccentric single leg control.      NMR and Therapeutic Activities:    [] (07692 or 71213) Provided verbal/tactile cueing for activities related to improving balance, coordination, kinesthetic sense, posture, motor skill, proprioception and motor activation to allow for proper function of core, proximal hip and LE with self care and ADLs  [] (42140) Gait Re-education- Provided training and instruction to the patient for proper LE, core and proximal hip recruitment and positioning and eccentric body weight control with ambulation re-education including up and down stairs     Home Exercise Program:    [x] (81640) Reviewed/Progressed HEP activities related to strengthening, flexibility, endurance, ROM of core, proximal hip and LE for functional self-care, mobility, lifting and ambulation/stair navigation   [] (08036)Reviewed/Progressed HEP activities related to improving balance, coordination, kinesthetic sense, posture, motor skill, proprioception of core, proximal hip and LE for self care, mobility, lifting, and ambulation/stair navigation      Manual Treatments:  PROM / STM / Oscillations-Mobs:  G-I, II, III, IV (PA's, Inf., Post.)  [] (26673) Provided manual therapy to mobilize LE, proximal hip and/or LS spine soft tissue/joints for the purpose of modulating pain, promoting relaxation,  increasing ROM, reducing/eliminating soft tissue swelling/inflammation/restriction, improving soft tissue extensibility and allowing for proper ROM for normal function with self care, mobility, lifting and ambulation. Modalities:   Ionto- with use of stat patch- dexamethasone. 8' with ed and set up. Charges:   Timed Code Treatment Minutes: 45'   Total Treatment Minutes: 40'     [] EVAL (LOW) 455 1011   [] EVAL (MOD) 20317   [] EVAL (HIGH) 04802   [] RE-EVAL   [x] QC(01757) x 1   [] IONTO  [] NMR (13992) x    [] VASO  [] Manual (11157) x      [x] Other: ionto  [x] TA x  1   [] Mech Traction (74465)  [] ES(attended) (33482)      [] ES (un) (11703): Access Code: [email or web address]  URL: [email or web address]  Date: 11/29/2021  Prepared by: Remington Yung [facility]    Exercises  Standing Gastroc Stretch - 2 x daily - 7 x weekly - 5 reps - 30 hold  Seated Table Hamstring Stretch - 2 x daily - 7 x weekly - 1 sets - 5 reps - 30 hold  Long Sitting Calf Stretch with Strap - 2 x daily - 7 x weekly - 1 sets - 5 reps - 30 hold  Supine Heel Slide with Strap - 1 x daily - 7 x weekly - 3 sets - 10 reps  Straight Leg Raise - 2 x daily - 7 x weekly - 3 sets - 10 reps  Sidelying Hip Abduction - 2 x daily - 7 x weekly - 3 sets - 10 reps  Prone Hip Extension - 2 x daily - 7 x weekly - 3 sets - 10 reps  Sidelying Hip Adduction - 2 x daily - 7 x weekly - 3 sets - 10 reps  Clamshell with Resistance - 1 x daily - 3 x weekly - 3 sets - 10 reps  Standing Single Leg Heel Raise - 1 x daily - 3 x weekly - 3 sets - 10 reps  Single Leg Bridge - 1 x daily - 3 x weekly - 3 sets - 10 reps  Single Leg Stance - 1 x daily - 3 x weekly - 5 sets - 30 hold          GOALS:  Patient stated goal: return to walking without pain, return to golfing   [x] Progressing: [] Met: [] Not Met: [] Adjusted    Therapist goals for Patient:   Short Term Goals: To be achieved in: 2 weeks  1. Independent in HEP and progression per patient tolerance, in order to prevent re-injury. [] Progressing: [x] Met: [] Not Met: [] Adjusted   2. Patient will have a decrease in pain to facilitate improvement in movement, function, and ADLs as indicated by Functional Deficits. [x] Progressing: [] Met: [] Not Met: [] Adjusted    Long Term Goals: To be achieved in: 6-8 weeks  1. Disability index score of 20% or less for the LEFS to assist with reaching prior level of function. [x] Progressing: [] Met: [] Not Met: [] Adjusted   2. Patient will demonstrate increased AROM to 120+ flexion, 0+ extension  to allow for proper joint functioning as indicated by patients Functional Deficits. [] Progressing: [x] Met: [] Not Met: [] Adjusted  3.  Patient will demonstrate an increase in Strength to 4/5 or greater  to allow for proper functional mobility as indicated by patients Functional Deficits. [x] Progressing: [] Met: [] Not Met: [] Adjusted  4. Patient will return to ambulation on all surfaces without increased symptoms or restriction. [x] Progressing: [] Met: [] Not Met: [] Adjusted  5. Patient will return to navigating stairs without increased symptoms or restriction. [x] Progressing: [] Met: [] Not Met: [] Adjusted        Overall Progression Towards Functional goals/ Treatment Progress Update:  [x] Patient is progressing as expected towards functional goals listed. [] Progression is slowed due to complexities/Impairments listed. [] Progression has been slowed due to co-morbidities. [] Plan just implemented, too soon to assess goals progression <30days   [] Goals require adjustment due to lack of progress  [] Patient is not progressing as expected and requires additional follow up with physician  [] Other    Prognosis for POC: [x] Good [] Fair  [] Poor      Patient requires continued skilled intervention: [x] Yes  [] No    Treatment/Activity Tolerance:  [x] Patient able to complete treatment  [] Patient limited by fatigue  [] Patient limited by pain     [] Patient limited by other medical complications  [] Other: Pt shan tx well. He was able to progress his PREs today. He reported being able to leg press and do knee ext SL without c/o pain; however, I did ask him to continue to do them ECL. I also asked him to continue the use of the brace until next week when he sees the MD.  He has been able to do stairs without c/o. I did advise him to take it easy still and not overdo it. He is going to swing the golf club this weekend. I told him to do so lightly. Pt would continue to benefit from skilled PT to improve strength, flexibility, pain, function, and return to PLOF.           Return to Play: (if applicable)   []  Stage 1: Intro to Strength   []  Stage 2: Return to Run and Strength   []  Stage 3: Return to Jump and Strength   []  Stage 4: Dynamic Strength and Agility   []  Stage 5: Sport Specific Training     []  Ready to Return to Play, Meets All Above Stages   []  Not Ready for Return to Sport   Comments:                               PLAN:  Progress to more WB activities as listed in flowsheet. Progress per pt shan. Pt to be out of town the remainder of the week. [x] Continue per plan of care [] Alter current plan (see comments above)  [] Plan of care initiated [] Hold pending MD visit [] Discharge  Note: If patient does not return for scheduled/ recommended follow up visits, this note will serve as a discharge from care along with most recent update on progress.         Electronically signed by:   Ananda Jeter, DPT [id number]

## 2022-02-04 ENCOUNTER — APPOINTMENT (OUTPATIENT)
Dept: PHYSICAL THERAPY | Age: 47
End: 2022-02-04
Payer: COMMERCIAL

## 2022-02-08 ENCOUNTER — OFFICE VISIT (OUTPATIENT)
Dept: ORTHOPEDIC SURGERY | Age: 47
End: 2022-02-08

## 2022-02-08 ENCOUNTER — HOSPITAL ENCOUNTER (OUTPATIENT)
Dept: PHYSICAL THERAPY | Age: 47
Setting detail: THERAPIES SERIES
Discharge: HOME | End: 2022-02-08
Payer: COMMERCIAL

## 2022-02-08 VITALS — HEIGHT: 74 IN | BODY MASS INDEX: 29.77 KG/M2 | WEIGHT: 232 LBS

## 2022-02-08 DIAGNOSIS — M25.561 RIGHT KNEE PAIN, UNSPECIFIED CHRONICITY: Primary | ICD-10-CM

## 2022-02-08 PROCEDURE — 97033 APP MDLTY 1+IONTPHRSIS EA 15: CPT | Performed by: PHYSICAL THERAPIST

## 2022-02-08 PROCEDURE — 97110 THERAPEUTIC EXERCISES: CPT | Performed by: PHYSICAL THERAPIST

## 2022-02-08 PROCEDURE — 99024 POSTOP FOLLOW-UP VISIT: CPT | Performed by: ORTHOPAEDIC SURGERY

## 2022-02-08 RX ORDER — FLUTICASONE PROPIONATE 50 MCG
2 SPRAY, SUSPENSION (ML) NASAL DAILY
COMMUNITY
Start: 2022-02-07 | End: 2022-10-14

## 2022-02-08 NOTE — PLAN OF CARE
[facility] a Department of [address]  Santiago Goldman [address], [address]  Office: [phone]  Fax:  [phone]     Physical Therapy Re-Certification Plan of Care    Dear Dr. Jennifer Tomlinson,    We had the pleasure of treating the following patient for physical therapy services at [address] [place] [address]. A summary of our findings can be found in the updated assessment below. This includes our plan of care. If you have any questions or concerns regarding these findings, please do not hesitate to contact me at the office phone number checked above. Thank you for the referral.     Physician Signature:________________________________Date:__________________  By signing above (or electronic signature), therapists plan is approved by physician    Date Range Of Visits: 11/11/21-2/8/2022  Total Visits to Date: 21  Overall Response to Treatment:   [] Patient is responding well to treatment and improvement is noted with regards to goals   [] Patient should continue to improve in reasonable time if they continue HEP   [] Patient has plateaued and is no longer responding to skilled PT intervention    [] Patient is getting worse and would benefit from return to referring MD   [] Patient unable to adhere to initial POC   [x] Other: Pt shan tx well. He has had a decrease in his recent symptoms. He has been using his brace. He is happy with his f/u with the MD today. He does demo weakness that would continue to benefit from being addressed. Per MD, we can start utilizing the AlterG at 6 months post op. I did discuss this with the pt and explained options for using the AlterG. He knows to work on his quad flexibility. I've also encouraged him to lift like we are doing in tx at the gym so he is strengthening 3x/wk. He is understanding. I've also encouraged him to get ankle weights for home.   Pt would continue to benefit from skilled PT to improve strength, flexibility, pain, function, and return to PLOF. Recommend continuing tx 1-2x/wk x  6-12 wks. Physical Therapy Daily Treatment Note  Date:  2022    Patient Name:  Colin Troy    :  1975  MRN: [id number]    Medical/Treatment Diagnosis Information:  · Diagnosis: M76.51 (ICD-10-CM) - Patellar tendinitis of right knee; sp plica removal and patellar debridement 11/10  · Treatment Diagnosis: M25.561 R knee pain  Insurance/Certification information:  PT Insurance Information: UMR  Physician Information:  Referring Practitioner: Skylar Rose  Has the plan of care been signed (Y/N):        []  Yes  [x]  No     Date of Patient follow up with Physician: prn      Is this a Progress Report:     [x]  Yes  []  No        Progress report will be due (10 Rx or 30 days whichever is less): 8 Mar 22       Recertification will be due (POC Duration  / 90 days whichever is less):          Visit # Insurance Allowable Auth Required    through 21-3/31/22 []  Yes [x]  No        Functional Scale: LEFS 72.5%     Date assessed:  22      Latex Allergy:  [x]NO      []YES  Preferred Language for Healthcare:   [x]English       []other:    Pain level: 0/10     SUBJECTIVE:  He comes from seeing the MD.  This appointment went well. He had an x-ray that showed thee was no bone spurs in the tendon. He is feeling pretty good. He can tell he stopped the steroids, but he does feel the ionto has been helpful. He hasn't had pain on the patellar tendon like he did before. He leaves for a golf trip tomorrow, and he was cleared to play golf. He knows to play smart. He would like to return to running.       OBJECTIVE:    22    Observation:     Test measurements:       Effusion  RIGHT LEFT   10cm superior to patella     Midline patella     10cm inferior to patella       Flexibility L R Comment   Hamstring   SLR   Gastroc      ITB      Quad                ROM PROM AROM Overpressure Comment    L R L R L R    Flexion    147      Extension    Hyper 1                               Strength L R Comment   Quad  4    Hamstring  4+    Gastroc      Hip flexor  4+    Hip ABD  4-                       RESTRICTIONS/PRECAUTIONS:     Exercises/Interventions:   Exercises:  Exercise/Equipment Resistance/Repetitions Other comments   Stretching     Hamstring 5x30    Hip Flexion 5x:30    ITB     Grion     Quad 5x:30    Inclined Calf 5x:30    Towel Pull          SLR     Supine 3x10 2# Cuing to avoid quad lag   Prone 3x10 5#    Abduction 3x10 5#    Adducton 3x10 5#        SLR+ ABD    SLR+ NV?    Clams                Isometrics    Quad sets    Hip Adduction    Hamstring                Patellar Glides    Medial    Superior    Inferior        ROM    Sheet Pulls    Wall Slides    Edge of bed    Flexionator    Extensionator    Hang Weights    Ankle Pumps                        CKC    Calf raises    Wall sits    Step ups    Step up and over    Lateral Step Downs            Mini squats    CC TKE        Lateral band walks    Side Planks    Half moon    Single leg flow        Biodex-balance    Single leg stance 5x:30 Airex    Plyoback        Stool scoots     bridge SL SB HS Curls    Planks        PRE    Extension 3x10 32# ECLRANGE: 90/40   Flexion 3x10 32# SLRANGE: 0/90       Cable Column        Leg Press 3x10 100# ECL   RANGE: 70/10       Bike 5' L1   Treadmill          Cone walks          Manual                     Therapeutic Exercise and NMR EXR  [] (03335) Provided verbal/tactile cueing for activities related to strengthening, flexibility, endurance, ROM for improvements in LE, proximal hip, and core control with self care, mobility, lifting, ambulation.  [] (67090) Provided verbal/tactile cueing for activities related to improving balance, coordination, kinesthetic sense, posture, motor skill, proprioception  to assist with LE, proximal hip, and core control in self care, mobility, lifting, ambulation and eccentric single leg control. NMR and Therapeutic Activities:    [] (90734 or 97071) Provided verbal/tactile cueing for activities related to improving balance, coordination, kinesthetic sense, posture, motor skill, proprioception and motor activation to allow for proper function of core, proximal hip and LE with self care and ADLs  [] (64883) Gait Re-education- Provided training and instruction to the patient for proper LE, core and proximal hip recruitment and positioning and eccentric body weight control with ambulation re-education including up and down stairs     Home Exercise Program:    [x] (12081) Reviewed/Progressed HEP activities related to strengthening, flexibility, endurance, ROM of core, proximal hip and LE for functional self-care, mobility, lifting and ambulation/stair navigation   [] (66099)Reviewed/Progressed HEP activities related to improving balance, coordination, kinesthetic sense, posture, motor skill, proprioception of core, proximal hip and LE for self care, mobility, lifting, and ambulation/stair navigation      Manual Treatments:  PROM / STM / Oscillations-Mobs:  G-I, II, III, IV (PA's, Inf., Post.)  [] (35760) Provided manual therapy to mobilize LE, proximal hip and/or LS spine soft tissue/joints for the purpose of modulating pain, promoting relaxation,  increasing ROM, reducing/eliminating soft tissue swelling/inflammation/restriction, improving soft tissue extensibility and allowing for proper ROM for normal function with self care, mobility, lifting and ambulation. Modalities:   Ionto- with use of stat patch- dexamethasone. 8' with ed and set up.      Charges:   Timed Code Treatment Minutes: 35'   Total Treatment Minutes: 36'     [] EVAL (LOW) 01683   [] EVAL (MOD) 56077   [] EVAL (HIGH) 06685   [] RE-EVAL   [x] MA(97219) x 1   [] IONTO  [] NMR (75745) x    [] VASO  [] Manual (07985) x      [x] Other: ionto  [] TA x     [] Mech Traction (78718)  [] ES(attended) (18725) [] ES () (16071): Access Code: [email or web address]  URL: [email or web address]  Date: 11/29/2021  Prepared by: Mia Avila Cessna    Exercises  Standing Gastroc Stretch - 2 x daily - 7 x weekly - 5 reps - 30 hold  Seated Table Hamstring Stretch - 2 x daily - 7 x weekly - 1 sets - 5 reps - 30 hold  Long Sitting Calf Stretch with Strap - 2 x daily - 7 x weekly - 1 sets - 5 reps - 30 hold  Supine Heel Slide with Strap - 1 x daily - 7 x weekly - 3 sets - 10 reps  Straight Leg Raise - 2 x daily - 7 x weekly - 3 sets - 10 reps  Sidelying Hip Abduction - 2 x daily - 7 x weekly - 3 sets - 10 reps  Prone Hip Extension - 2 x daily - 7 x weekly - 3 sets - 10 reps  Sidelying Hip Adduction - 2 x daily - 7 x weekly - 3 sets - 10 reps  Clamshell with Resistance - 1 x daily - 3 x weekly - 3 sets - 10 reps  Standing Single Leg Heel Raise - 1 x daily - 3 x weekly - 3 sets - 10 reps  Single Leg Bridge - 1 x daily - 3 x weekly - 3 sets - 10 reps  Single Leg Stance - 1 x daily - 3 x weekly - 5 sets - 30 hold          GOALS:  Patient stated goal: return to walking without pain, return to golfing   [x] Progressing: [] Met: [] Not Met: [] Adjusted    Therapist goals for Patient:   Short Term Goals: To be achieved in: 2 weeks  1. Independent in HEP and progression per patient tolerance, in order to prevent re-injury. [] Progressing: [x] Met: [] Not Met: [] Adjusted   2. Patient will have a decrease in pain to facilitate improvement in movement, function, and ADLs as indicated by Functional Deficits. [] Progressing: [x] Met: [] Not Met: [] Adjusted    Long Term Goals: To be achieved in: 6-8 weeks  1. Disability index score of 20% or less for the LEFS to assist with reaching prior level of function. [x] Progressing: [] Met: [] Not Met: [] Adjusted   2. Patient will demonstrate increased AROM to 120+ flexion, 0+ extension  to allow for proper joint functioning as indicated by patients Functional Deficits.     [] Progressing: [x] Met: [] Not Met: [] Adjusted  3. Patient will demonstrate an increase in Strength to 4/5 or greater  to allow for proper functional mobility as indicated by patients Functional Deficits. [x] Progressing: [] Met: [] Not Met: [] Adjusted  4. Patient will return to ambulation on all surfaces without increased symptoms or restriction. [] Progressing: [x] Met: [] Not Met: [] Adjusted  5. Patient will return to navigating stairs without increased symptoms or restriction. [x] Progressing: [] Met: [] Not Met: [] Adjusted        Overall Progression Towards Functional goals/ Treatment Progress Update:  [x] Patient is progressing as expected towards functional goals listed. [] Progression is slowed due to complexities/Impairments listed. [] Progression has been slowed due to co-morbidities. [] Plan just implemented, too soon to assess goals progression <30days   [] Goals require adjustment due to lack of progress  [] Patient is not progressing as expected and requires additional follow up with physician  [] Other    Prognosis for POC: [x] Good [] Fair  [] Poor      Patient requires continued skilled intervention: [x] Yes  [] No    Treatment/Activity Tolerance:  [x] Patient able to complete treatment  [] Patient limited by fatigue  [] Patient limited by pain     [] Patient limited by other medical complications  [] Other: Pt shan tx well. He has had a decrease in his recent symptoms. He has been using his brace. He is happy with his f/u with the MD today. He does demo weakness that would continue to benefit from being addressed. Per MD, we can start utilizing the AlterG at 6 months post op. I did discuss this with the pt and explained options for using the AlterG. He knows to work on his quad flexibility. I've also encouraged him to lift like we are doing in tx at the gym so he is strengthening 3x/wk. He is understanding. I've also encouraged him to get ankle weights for home.   Pt would continue to benefit from skilled PT to improve strength, flexibility, pain, function, and return to PLOF. Recommend continuing tx 1-2x/wk x  6-12 wks. Return to Play: (if applicable)   []  Stage 1: Intro to Strength   []  Stage 2: Return to Run and Strength   []  Stage 3: Return to Jump and Strength   []  Stage 4: Dynamic Strength and Agility   []  Stage 5: Sport Specific Training     []  Ready to Return to Play, Meets All Above Stages   []  Not Ready for Return to Sport   Comments:                               PLAN:  Progress to more WB activities as listed in flowsheet. Continue ionto. Progress strength and progress to running in the Alter G at 6 months post op. [x] Continue per plan of care [] Alter current plan (see comments above)  [] Plan of care initiated [] Hold pending MD visit [] Discharge  Note: If patient does not return for scheduled/ recommended follow up visits, this note will serve as a discharge from care along with most recent update on progress.         Electronically signed by:   Jennie Estrella, [address] [facility], DPT [id number]

## 2022-02-08 NOTE — PROGRESS NOTES
Bettie Home turns today about 3 months out from right knee open patella tendon debridement. He is definitely better from having taken the Medrol Dosepak but as he comes off that he has some recurrence of discomfort. He has no pain with most activities but stairs still cause 1 or 2 out of 10 pain. Is definitely better than it was a couple of weeks ago. We have also been using a brace and iontophoresis. General Exam:    Vitals: Height 6' 1.5\" (1.867 m), weight 232 lb (105.2 kg). Constitutional: Patient is adequately groomed with no evidence of malnutrition  Mental Status: The patient is oriented to time, place and person. The patient's mood and affect are appropriate. Right knee today has full range of motion and no intra-articular effusion. Is some mild tenderness at the inferior pole the patella. There is fullness in that region he has good strength. Quad tone is good. Right knee lateral x-ray obtained in the office today and compared to x-rays taken on 8/20/21 demonstrates: The inferior pole has been appropriately debrided. Assessment: Improving symptoms after right knee open patella tendon debridement. Plan: Continue with rehab. When he transitions back toward running will use the antigravity treadmill. He will also continue with iontophoresis.   Follow-up with me on an as-needed basis peer

## 2022-02-15 ENCOUNTER — HOSPITAL ENCOUNTER (OUTPATIENT)
Dept: PHYSICAL THERAPY | Age: 47
Setting detail: THERAPIES SERIES
Discharge: HOME | End: 2022-02-15
Payer: COMMERCIAL

## 2022-02-15 PROCEDURE — 97110 THERAPEUTIC EXERCISES: CPT | Performed by: PHYSICAL THERAPIST

## 2022-02-15 PROCEDURE — 97033 APP MDLTY 1+IONTPHRSIS EA 15: CPT | Performed by: PHYSICAL THERAPIST

## 2022-02-15 NOTE — FLOWSHEET NOTE
flexor  4+    Hip ABD  4-                       RESTRICTIONS/PRECAUTIONS:     Exercises/Interventions:   Exercises:  Exercise/Equipment Resistance/Repetitions Other comments   Stretching     Hamstring 5x30    Hip Flexion 5x:30    ITB     Grion     Quad 5x:30    Inclined Calf     Towel Pull          SLR     Supine Cuing to avoid quad lag   Prone    Abduction    Adducton        SLR+ ABD    SLR+ 4x:20   Clams                Isometrics    Quad sets    Hip Adduction    Hamstring                Patellar Glides    Medial    Superior    Inferior        ROM    Sheet Pulls    Wall Slides    Edge of bed    Flexionator    Extensionator    Hang Weights    Ankle Pumps                        CKC    Calf raises    Wall sits 5x:20-:30   Step ups    Step up and over    Lateral Step Downs 2x10 ECL           Mini squats    CC TKE        Lateral band walks    Side Planks    Half moon    Single leg flow        Biodex-balance    Single leg stance 5x:30 Airex    Plyoback        Stool scoots     bridge SL SB HS Curls    Planks        PRE    Extension 3x10 32# ECLRANGE: 90/40   Flexion 3x10 32# SLRANGE: 0/90       Cable Column        Leg Press 3x10 105# ECL   RANGE: 70/10       Bike 3' L1   Treadmill          Cone walks          Manual                     Therapeutic Exercise and NMR EXR  [] (17507) Provided verbal/tactile cueing for activities related to strengthening, flexibility, endurance, ROM for improvements in LE, proximal hip, and core control with self care, mobility, lifting, ambulation.  [] (35618) Provided verbal/tactile cueing for activities related to improving balance, coordination, kinesthetic sense, posture, motor skill, proprioception  to assist with LE, proximal hip, and core control in self care, mobility, lifting, ambulation and eccentric single leg control.      NMR and Therapeutic Activities:    [] (67389 or 56147) Provided verbal/tactile cueing for activities related to improving balance, coordination, kinesthetic sense, posture, motor skill, proprioception and motor activation to allow for proper function of core, proximal hip and LE with self care and ADLs  [] (47130) Gait Re-education- Provided training and instruction to the patient for proper LE, core and proximal hip recruitment and positioning and eccentric body weight control with ambulation re-education including up and down stairs     Home Exercise Program:    [x] (20763) Reviewed/Progressed HEP activities related to strengthening, flexibility, endurance, ROM of core, proximal hip and LE for functional self-care, mobility, lifting and ambulation/stair navigation   [] (80408)Reviewed/Progressed HEP activities related to improving balance, coordination, kinesthetic sense, posture, motor skill, proprioception of core, proximal hip and LE for self care, mobility, lifting, and ambulation/stair navigation      Manual Treatments:  PROM / STM / Oscillations-Mobs:  G-I, II, III, IV (PA's, Inf., Post.)  [] (39235) Provided manual therapy to mobilize LE, proximal hip and/or LS spine soft tissue/joints for the purpose of modulating pain, promoting relaxation,  increasing ROM, reducing/eliminating soft tissue swelling/inflammation/restriction, improving soft tissue extensibility and allowing for proper ROM for normal function with self care, mobility, lifting and ambulation. Modalities:   Ionto- with use of stat patch- dexamethasone. 8' with ed and set up. Charges:   Timed Code Treatment Minutes: 30'   Total Treatment Minutes: 30'     [] EVAL (LOW) 455 1011   [] EVAL (MOD) 12285   [] EVAL (HIGH) 42850   [] RE-EVAL   [x] MW(40840) x 1   [] IONTO  [] NMR (30706) x    [] VASO  [] Manual (45269) x      [x] Other: ionto  [] TA x     [] Mech Traction (01216)  [] ES(attended) (12854)      [] ES (un) (21052): Access Code: [email or web address]  URL: [email or web address]  Date: 11/29/2021  Prepared by: Yung Justice [facility]    Exercises  Standing Gastroc Stretch - 2 x daily - 7 x weekly - 5 reps - 30 hold  Seated Table Hamstring Stretch - 2 x daily - 7 x weekly - 1 sets - 5 reps - 30 hold  Long Sitting Calf Stretch with Strap - 2 x daily - 7 x weekly - 1 sets - 5 reps - 30 hold  Supine Heel Slide with Strap - 1 x daily - 7 x weekly - 3 sets - 10 reps  Straight Leg Raise - 2 x daily - 7 x weekly - 3 sets - 10 reps  Sidelying Hip Abduction - 2 x daily - 7 x weekly - 3 sets - 10 reps  Prone Hip Extension - 2 x daily - 7 x weekly - 3 sets - 10 reps  Sidelying Hip Adduction - 2 x daily - 7 x weekly - 3 sets - 10 reps  Clamshell with Resistance - 1 x daily - 3 x weekly - 3 sets - 10 reps  Standing Single Leg Heel Raise - 1 x daily - 3 x weekly - 3 sets - 10 reps  Single Leg Bridge - 1 x daily - 3 x weekly - 3 sets - 10 reps  Single Leg Stance - 1 x daily - 3 x weekly - 5 sets - 30 hold          GOALS:  Patient stated goal: return to walking without pain, return to golfing   [x] Progressing: [] Met: [] Not Met: [] Adjusted    Therapist goals for Patient:   Short Term Goals: To be achieved in: 2 weeks  1. Independent in HEP and progression per patient tolerance, in order to prevent re-injury. [] Progressing: [x] Met: [] Not Met: [] Adjusted   2. Patient will have a decrease in pain to facilitate improvement in movement, function, and ADLs as indicated by Functional Deficits. [] Progressing: [x] Met: [] Not Met: [] Adjusted    Long Term Goals: To be achieved in: 6-8 weeks  1. Disability index score of 20% or less for the LEFS to assist with reaching prior level of function. [x] Progressing: [] Met: [] Not Met: [] Adjusted   2. Patient will demonstrate increased AROM to 120+ flexion, 0+ extension  to allow for proper joint functioning as indicated by patients Functional Deficits. [] Progressing: [x] Met: [] Not Met: [] Adjusted  3. Patient will demonstrate an increase in Strength to 4/5 or greater  to allow for proper functional mobility as indicated by patients Functional Deficits.    [x] Progressing: [] Met: [] Not Met: [] Adjusted  4. Patient will return to ambulation on all surfaces without increased symptoms or restriction. [] Progressing: [x] Met: [] Not Met: [] Adjusted  5. Patient will return to navigating stairs without increased symptoms or restriction. [x] Progressing: [] Met: [] Not Met: [] Adjusted        Overall Progression Towards Functional goals/ Treatment Progress Update:  [x] Patient is progressing as expected towards functional goals listed. [] Progression is slowed due to complexities/Impairments listed. [] Progression has been slowed due to co-morbidities. [] Plan just implemented, too soon to assess goals progression <30days   [] Goals require adjustment due to lack of progress  [] Patient is not progressing as expected and requires additional follow up with physician  [] Other    Prognosis for POC: [x] Good [] Fair  [] Poor      Patient requires continued skilled intervention: [x] Yes  [] No    Treatment/Activity Tolerance:  [x] Patient able to complete treatment  [] Patient limited by fatigue  [] Patient limited by pain     [] Patient limited by other medical complications  [] Other: Pt shan tx well. We did modify his routine due his time limitations. He will do his SLR later today. I stressed that he should do his PREs on the machines 3x/wk total.  He is understanding. Pt demo some pain with quad sets, but this pain is more likely due to playing golf for several days in a row. He is making progress and feeling more comfortable with his knee. Pt would continue to benefit from skilled PT to improve strength, flexibility, pain, function, and return to PLOF.           Return to Play: (if applicable)   []  Stage 1: Intro to Strength   []  Stage 2: Return to Run and Strength   []  Stage 3: Return to Jump and Strength   []  Stage 4: Dynamic Strength and Agility   []  Stage 5: Sport Specific Training     []  Ready to Return to Play, Meets All Above Stages   []  Not Ready for Return to Sport   Comments:                               PLAN:  Progress to more WB activities as listed in flowsheet. Continue ionto. Progress strength and progress to running in the Alter G at 6 months post op. Consider taking measurements NV. [x] Continue per plan of care [] Alter current plan (see comments above)  [] Plan of care initiated [] Hold pending MD visit [] Discharge  Note: If patient does not return for scheduled/ recommended follow up visits, this note will serve as a discharge from care along with most recent update on progress.         Electronically signed by:   Ananda Chacon, DPT [id number]

## 2022-02-17 ENCOUNTER — HOSPITAL ENCOUNTER (OUTPATIENT)
Dept: PHYSICAL THERAPY | Age: 47
Setting detail: THERAPIES SERIES
Discharge: HOME | End: 2022-02-17
Payer: COMMERCIAL

## 2022-02-17 PROCEDURE — 97016 VASOPNEUMATIC DEVICE THERAPY: CPT | Performed by: PHYSICAL THERAPIST

## 2022-02-17 PROCEDURE — 97110 THERAPEUTIC EXERCISES: CPT | Performed by: PHYSICAL THERAPIST

## 2022-02-17 PROCEDURE — 97033 APP MDLTY 1+IONTPHRSIS EA 15: CPT | Performed by: PHYSICAL THERAPIST

## 2022-02-17 NOTE — FLOWSHEET NOTE
Strength L R Comment   Quad  4    Hamstring  4+    Gastroc      Hip flexor  4+    Hip ABD  4-                       RESTRICTIONS/PRECAUTIONS:     Exercises/Interventions:   Exercises:  Exercise/Equipment Resistance/Repetitions Other comments   Stretching     Hamstring 5x30    Hip Flexion 5x:30    ITB     Grion     Quad 5x:30    Inclined Calf     Towel Pull          SLR     Supine 3x10 5# Cuing to avoid quad lag   Prone 3x10 5#    Abduction 3x10 5#    Adducton 3x10 5#        SLR+ ABD    SLR+    Clams                Isometrics    Quad sets    Hip Adduction    Hamstring                Patellar Glides    Medial    Superior    Inferior        ROM    Sheet Pulls    Wall Slides    Edge of bed    Flexionator    Extensionator    Hang Weights    Ankle Pumps                        CKC    Calf raises    Wall sits    Step ups    Step up and over    Lateral Step Downs            Mini squats    CC TKE        Lateral band walks    Side Planks    Half moon    Single leg flow        Biodex-balance    Single leg stance 5x:30 Airex    Plyoback        Stool scoots     bridge SL SB HS Curls    Planks        PRE    Extension 3x10 32# ECLRANGE: 90/40   Flexion 3x10 32# SLRANGE: 0/90       Cable Column        Leg Press 3x10 100# ECL   RANGE: 70/10       Bike 5' L1   Treadmill          Cone walks          Manual                     Therapeutic Exercise and NMR EXR  [] (57417) Provided verbal/tactile cueing for activities related to strengthening, flexibility, endurance, ROM for improvements in LE, proximal hip, and core control with self care, mobility, lifting, ambulation.  [] (09038) Provided verbal/tactile cueing for activities related to improving balance, coordination, kinesthetic sense, posture, motor skill, proprioception  to assist with LE, proximal hip, and core control in self care, mobility, lifting, ambulation and eccentric single leg control.      NMR and Therapeutic Activities:    [] (41891 or 12036) Provided verbal/tactile cueing for activities related to improving balance, coordination, kinesthetic sense, posture, motor skill, proprioception and motor activation to allow for proper function of core, proximal hip and LE with self care and ADLs  [] (12228) Gait Re-education- Provided training and instruction to the patient for proper LE, core and proximal hip recruitment and positioning and eccentric body weight control with ambulation re-education including up and down stairs     Home Exercise Program:    [x] (06887) Reviewed/Progressed HEP activities related to strengthening, flexibility, endurance, ROM of core, proximal hip and LE for functional self-care, mobility, lifting and ambulation/stair navigation   [] (09339)Reviewed/Progressed HEP activities related to improving balance, coordination, kinesthetic sense, posture, motor skill, proprioception of core, proximal hip and LE for self care, mobility, lifting, and ambulation/stair navigation      Manual Treatments:  PROM / STM / Oscillations-Mobs:  G-I, II, III, IV (PA's, Inf., Post.)  [] (65815) Provided manual therapy to mobilize LE, proximal hip and/or LS spine soft tissue/joints for the purpose of modulating pain, promoting relaxation,  increasing ROM, reducing/eliminating soft tissue swelling/inflammation/restriction, improving soft tissue extensibility and allowing for proper ROM for normal function with self care, mobility, lifting and ambulation. Modalities:   Ionto- with use of stat patch- dexamethasone. 8' with ed and set up. Charges:   Timed Code Treatment Minutes: 23'   Total Treatment Minutes: 39'     [] EVAL (LOW) 455 1011   [] EVAL (MOD) 38533   [] EVAL (HIGH) 69394   [] RE-EVAL   [x] TR(70369) x 1   [] IONTO  [] NMR (67760) x    [x] VASO  [] Manual (07316) x      [x] Other: ionto  [] TA x     [] Mech Traction (72728)  [] ES(attended) (31227)      [] ES (un) (86719): Access Code: [email or web address]  URL: [email or web address]  Date: 11/29/2021  Prepared by: Familia Llanes    Exercises  Standing Gastroc Stretch - 2 x daily - 7 x weekly - 5 reps - 30 hold  Seated Table Hamstring Stretch - 2 x daily - 7 x weekly - 1 sets - 5 reps - 30 hold  Long Sitting Calf Stretch with Strap - 2 x daily - 7 x weekly - 1 sets - 5 reps - 30 hold  Supine Heel Slide with Strap - 1 x daily - 7 x weekly - 3 sets - 10 reps  Straight Leg Raise - 2 x daily - 7 x weekly - 3 sets - 10 reps  Sidelying Hip Abduction - 2 x daily - 7 x weekly - 3 sets - 10 reps  Prone Hip Extension - 2 x daily - 7 x weekly - 3 sets - 10 reps  Sidelying Hip Adduction - 2 x daily - 7 x weekly - 3 sets - 10 reps  Clamshell with Resistance - 1 x daily - 3 x weekly - 3 sets - 10 reps  Standing Single Leg Heel Raise - 1 x daily - 3 x weekly - 3 sets - 10 reps  Single Leg Bridge - 1 x daily - 3 x weekly - 3 sets - 10 reps  Single Leg Stance - 1 x daily - 3 x weekly - 5 sets - 30 hold          GOALS:  Patient stated goal: return to walking without pain, return to golfing   [x] Progressing: [] Met: [] Not Met: [] Adjusted    Therapist goals for Patient:   Short Term Goals: To be achieved in: 2 weeks  1. Independent in HEP and progression per patient tolerance, in order to prevent re-injury. [] Progressing: [x] Met: [] Not Met: [] Adjusted   2. Patient will have a decrease in pain to facilitate improvement in movement, function, and ADLs as indicated by Functional Deficits. [] Progressing: [x] Met: [] Not Met: [] Adjusted    Long Term Goals: To be achieved in: 6-8 weeks  1. Disability index score of 20% or less for the LEFS to assist with reaching prior level of function. [x] Progressing: [] Met: [] Not Met: [] Adjusted   2. Patient will demonstrate increased AROM to 120+ flexion, 0+ extension  to allow for proper joint functioning as indicated by patients Functional Deficits. [] Progressing: [x] Met: [] Not Met: [] Adjusted  3.  Patient will demonstrate an increase in Strength to 4/5 or greater  to allow for proper functional mobility as indicated by patients Functional Deficits. [x] Progressing: [] Met: [] Not Met: [] Adjusted  4. Patient will return to ambulation on all surfaces without increased symptoms or restriction. [] Progressing: [x] Met: [] Not Met: [] Adjusted  5. Patient will return to navigating stairs without increased symptoms or restriction. [x] Progressing: [] Met: [] Not Met: [] Adjusted        Overall Progression Towards Functional goals/ Treatment Progress Update:  [x] Patient is progressing as expected towards functional goals listed. [] Progression is slowed due to complexities/Impairments listed. [] Progression has been slowed due to co-morbidities. [] Plan just implemented, too soon to assess goals progression <30days   [] Goals require adjustment due to lack of progress  [] Patient is not progressing as expected and requires additional follow up with physician  [] Other    Prognosis for POC: [x] Good [] Fair  [] Poor      Patient requires continued skilled intervention: [x] Yes  [] No    Treatment/Activity Tolerance:  [x] Patient able to complete treatment  [] Patient limited by fatigue  [] Patient limited by pain     [] Patient limited by other medical complications  [] Other: Pt shan tx fair/well. Cuing is provided to avoid quad lag. I stressed that the pt try to do his SLR at home. He can do hip 4 way with bands if that works better for him. He demo tenderness today on inferior pole of patella. He did want to try the game ready, which we did today to try to help with his pain. We also held off on the newer exercises from last visit. Pt would continue to benefit from skilled PT to improve strength, flexibility, pain, function, and return to PLOF.           Return to Play: (if applicable)   []  Stage 1: Intro to Strength   []  Stage 2: Return to Run and Strength   []  Stage 3: Return to Jump and Strength   []  Stage 4: Dynamic Strength and Agility   [] Stage 5: Sport Specific Training     []  Ready to Return to Play, Meets All Above Stages   []  Not Ready for Return to Sport   Comments:                               PLAN:  Progress to more WB activities as listed in flowsheet. Continue ionto. Progress strength and progress to running in the Alter G at 6 months post op. Consider taking measurements NV. Monitor pt's pain. [x] Continue per plan of care [] Alter current plan (see comments above)  [] Plan of care initiated [] Hold pending MD visit [] Discharge  Note: If patient does not return for scheduled/ recommended follow up visits, this note will serve as a discharge from care along with most recent update on progress.         Electronically signed by:   Luis Hoffmann, [address] [facility], DPT [id number] none

## 2022-02-23 ENCOUNTER — HOSPITAL ENCOUNTER (OUTPATIENT)
Dept: PHYSICAL THERAPY | Age: 47
Setting detail: THERAPIES SERIES
Discharge: HOME | End: 2022-02-23
Payer: COMMERCIAL

## 2022-02-23 PROCEDURE — 97110 THERAPEUTIC EXERCISES: CPT | Performed by: PHYSICAL THERAPIST

## 2022-02-23 PROCEDURE — 97530 THERAPEUTIC ACTIVITIES: CPT | Performed by: PHYSICAL THERAPIST

## 2022-02-23 PROCEDURE — 97033 APP MDLTY 1+IONTPHRSIS EA 15: CPT | Performed by: PHYSICAL THERAPIST

## 2022-02-23 NOTE — FLOWSHEET NOTE
[facility] a Department of [address]  [place] [address], [address]  Office: [phone]  Fax:  [phone]             Physical Therapy Daily Treatment Note  Date:  2022    Patient Name:  Mariel Ramirez    :  1975  MRN: [id number]    Medical/Treatment Diagnosis Information:  · Diagnosis: M76.51 (ICD-10-CM) - Patellar tendinitis of right knee; sp plica removal and patellar debridement 11/10  · Treatment Diagnosis: M25.561 R knee pain  Insurance/Certification information:  PT Insurance Information: UMR  Physician Information:  Referring Practitioner: Quirino Galarza  Has the plan of care been signed (Y/N):        []  Yes  [x]  No     Date of Patient follow up with Physician: prn      Is this a Progress Report:     [x]  Yes  []  No        Progress report will be due (10 Rx or 30 days whichever is less): 8 Mar 22       Recertification will be due (POC Duration  / 90 days whichever is less):          Visit # Insurance Allowable Auth Required   24 45 through 21-3/31/22 []  Yes [x]  No        Functional Scale: LEFS 72.5%     Date assessed:  22      Latex Allergy:  [x]NO      []YES  Preferred Language for Healthcare:   [x]English       []other:    Pain level: 0/10 walking in.  2-3/10 with steps and getting in/out of the car     SUBJECTIVE:  He walked 2 miles on , and it hurt. He did machines lifting last night. He couldn't do prone ham curls as it hurt in his knee. It's frustrating as the pain feels the same as it did before. If he needs a MRI, he'd like to do it before his plan year starts over on 22. OBJECTIVE:  22- TTP over inferior pole of patella. Less pain with medial force of patella with squat. Leukotape helped with pain today. Discussed using brace when he goes for walks. 22    Observation:     Test measurements:  TTP inferior pole of patella.        Effusion  RIGHT LEFT   10cm superior to patella     Midline patella     10cm inferior to patella       Flexibility L R Comment   Hamstring   SLR   Gastroc      ITB      Quad                ROM PROM AROM Overpressure Comment    L R L R L R    Flexion    147      Extension    Hyper 1                               Strength L R Comment   Quad  4    Hamstring  4+    Gastroc      Hip flexor  4+    Hip ABD  4-                       RESTRICTIONS/PRECAUTIONS:     Exercises/Interventions:   Exercises:  Exercise/Equipment Resistance/Repetitions Other comments   Stretching     Hamstring 5x30    Hip Flexion 5x:30    ITB     Grion     Quad 5x:30    Inclined Calf     Towel Pull          SLR     Supine 3x10 6# Cuing to avoid quad lag   Prone 3x10 6#    Abduction 3x10 6#    Adducton 3x10 6#        SLR+ ABD 5x:30   SLR+ 4x:20   Clams 3x10 15#               Isometrics    Quad sets    Hip Adduction    Hamstring                Patellar Glides    Medial    Superior    Inferior        ROM    Sheet Pulls    Wall Slides    Edge of bed    Flexionator    Extensionator    Hang Weights    Ankle Pumps                        CKC    Calf raises    Wall sits    Step ups    Step up and over    Lateral Step Downs            Mini squats    CC TKE        Lateral band walks 5 laps pillar to pillar red loop   Side Planks    Half moon    Single leg flow    [place] into ball on wall 5x:05           Biodex-balance    Single leg stance 5x:30 Airex    Plyoback        Stool scoots     bridge SL SB HS Curls    Planks        PRE    Extension RANGE: 90/40   Flexion RANGE: 0/90       Cable Column        Leg Press 10x:20 isometric red loop bandRANGE: 70/10       Bike    Treadmill          Cone walks          Manual     Nathan taping for patellar tracking  pulled patella medially               Therapeutic Exercise and NMR EXR  [] (02016) Provided verbal/tactile cueing for activities related to strengthening, flexibility, endurance, ROM for improvements in LE, proximal hip, and core control with self care, mobility, lifting, ambulation.  [] (12634) Provided verbal/tactile cueing for activities related to improving balance, coordination, kinesthetic sense, posture, motor skill, proprioception  to assist with LE, proximal hip, and core control in self care, mobility, lifting, ambulation and eccentric single leg control. NMR and Therapeutic Activities:    [] (51130 or 78145) Provided verbal/tactile cueing for activities related to improving balance, coordination, kinesthetic sense, posture, motor skill, proprioception and motor activation to allow for proper function of core, proximal hip and LE with self care and ADLs  [] (76363) Gait Re-education- Provided training and instruction to the patient for proper LE, core and proximal hip recruitment and positioning and eccentric body weight control with ambulation re-education including up and down stairs     Home Exercise Program:    [x] (54466) Reviewed/Progressed HEP activities related to strengthening, flexibility, endurance, ROM of core, proximal hip and LE for functional self-care, mobility, lifting and ambulation/stair navigation   [] (25364)Reviewed/Progressed HEP activities related to improving balance, coordination, kinesthetic sense, posture, motor skill, proprioception of core, proximal hip and LE for self care, mobility, lifting, and ambulation/stair navigation      Manual Treatments:  PROM / STM / Oscillations-Mobs:  G-I, II, III, IV (PA's, Inf., Post.)  [] (51997) Provided manual therapy to mobilize LE, proximal hip and/or LS spine soft tissue/joints for the purpose of modulating pain, promoting relaxation,  increasing ROM, reducing/eliminating soft tissue swelling/inflammation/restriction, improving soft tissue extensibility and allowing for proper ROM for normal function with self care, mobility, lifting and ambulation. Modalities: 10' ice  Ionto- with use of stat patch- dexamethasone. 8' with ed and set up.      Charges: Timed Code Treatment Minutes: 45'   Total Treatment Minutes: [facility]     [] EVAL (LOW) 455 1011   [] EVAL (MOD) 38048   [] EVAL (HIGH) 38290   [] RE-EVAL   [x] AI(26281) x 1   [] IONTO  [] NMR (89924) x    [] VASO  [] Manual (53062) x      [x] Other: ionto  [x] TA x1    [] Mech Traction (34660)  [] ES(attended) (72881)      [] ES (un) (74760): Access Code: [email or web address]  URL: [email or web address]/  Date: 11/29/2021  Prepared by: Silverio Llanes    Exercises  Standing Gastroc Stretch - 2 x daily - 7 x weekly - 5 reps - 30 hold  Seated Table Hamstring Stretch - 2 x daily - 7 x weekly - 1 sets - 5 reps - 30 hold  Long Sitting Calf Stretch with Strap - 2 x daily - 7 x weekly - 1 sets - 5 reps - 30 hold  Supine Heel Slide with Strap - 1 x daily - 7 x weekly - 3 sets - 10 reps  Straight Leg Raise - 2 x daily - 7 x weekly - 3 sets - 10 reps  Sidelying Hip Abduction - 2 x daily - 7 x weekly - 3 sets - 10 reps  Prone Hip Extension - 2 x daily - 7 x weekly - 3 sets - 10 reps  Sidelying Hip Adduction - 2 x daily - 7 x weekly - 3 sets - 10 reps  Clamshell with Resistance - 1 x daily - 3 x weekly - 3 sets - 10 reps  Standing Single Leg Heel Raise - 1 x daily - 3 x weekly - 3 sets - 10 reps  Single Leg Bridge - 1 x daily - 3 x weekly - 3 sets - 10 reps  Single Leg Stance - 1 x daily - 3 x weekly - 5 sets - 30 hold          GOALS:  Patient stated goal: return to walking without pain, return to golfing   [x] Progressing: [] Met: [] Not Met: [] Adjusted    Therapist goals for Patient:   Short Term Goals: To be achieved in: 2 weeks  1. Independent in HEP and progression per patient tolerance, in order to prevent re-injury. [] Progressing: [x] Met: [] Not Met: [] Adjusted   2. Patient will have a decrease in pain to facilitate improvement in movement, function, and ADLs as indicated by Functional Deficits. [] Progressing: [x] Met: [] Not Met: [] Adjusted    Long Term Goals: To be achieved in: 6-8 weeks  1.  Disability index score of 20% or less for the LEFS to assist with reaching prior level of function. [x] Progressing: [] Met: [] Not Met: [] Adjusted   2. Patient will demonstrate increased AROM to 120+ flexion, 0+ extension  to allow for proper joint functioning as indicated by patients Functional Deficits. [] Progressing: [x] Met: [] Not Met: [] Adjusted  3. Patient will demonstrate an increase in Strength to 4/5 or greater  to allow for proper functional mobility as indicated by patients Functional Deficits. [x] Progressing: [] Met: [] Not Met: [] Adjusted  4. Patient will return to ambulation on all surfaces without increased symptoms or restriction. [] Progressing: [x] Met: [] Not Met: [] Adjusted  5. Patient will return to navigating stairs without increased symptoms or restriction. [x] Progressing: [] Met: [] Not Met: [] Adjusted        Overall Progression Towards Functional goals/ Treatment Progress Update:  [x] Patient is progressing as expected towards functional goals listed. [] Progression is slowed due to complexities/Impairments listed. [] Progression has been slowed due to co-morbidities. [] Plan just implemented, too soon to assess goals progression <30days   [] Goals require adjustment due to lack of progress  [] Patient is not progressing as expected and requires additional follow up with physician  [] Other    Prognosis for POC: [x] Good [] Fair  [] Poor      Patient requires continued skilled intervention: [x] Yes  [] No    Treatment/Activity Tolerance:  [x] Patient able to complete treatment  [] Patient limited by fatigue  [] Patient limited by pain     [] Patient limited by other medical complications  [] Other: Pt shan tx fair/well. Pt did lift yesterday on the machines. Due to this, we did focus on hip strengthening. Pt did demo improvement of pain with patellar taping. 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   We will try to focus on these things as this did

## 2022-02-25 ENCOUNTER — HOSPITAL ENCOUNTER (OUTPATIENT)
Dept: PHYSICAL THERAPY | Age: 47
Setting detail: THERAPIES SERIES
Discharge: HOME | End: 2022-02-25
Payer: COMMERCIAL

## 2022-02-25 PROCEDURE — 97110 THERAPEUTIC EXERCISES: CPT | Performed by: PHYSICAL THERAPIST

## 2022-02-25 PROCEDURE — 97033 APP MDLTY 1+IONTPHRSIS EA 15: CPT | Performed by: PHYSICAL THERAPIST

## 2022-02-25 NOTE — FLOWSHEET NOTE
100 [facility] a Department of [address]  [place] [address], [address] [address]  Office: [phone]  Fax:  [phone]             Physical Therapy Daily Treatment Note  Date:  2022    Patient Name:  Basil Goyal    :  1975  MRN: [id number]    Medical/Treatment Diagnosis Information:  · Diagnosis: M76.51 (ICD-10-CM) - Patellar tendinitis of right knee; sp plica removal and patellar debridement 11/10  · Treatment Diagnosis: M25.561 R knee pain  Insurance/Certification information:  PT Insurance Information: UMR  Physician Information:  Referring Practitioner: Thais Polanco  Has the plan of care been signed (Y/N):        []  Yes  [x]  No     Date of Patient follow up with Physician: prn      Is this a Progress Report:     [x]  Yes  []  No        Progress report will be due (10 Rx or 30 days whichever is less): 8 Mar 22       Recertification will be due (POC Duration  / 90 days whichever is less):          Visit # Insurance Allowable Auth Required   25 45 through 21-3/31/22 []  Yes [x]  No        Functional Scale: LEFS 72.5%     Date assessed:  22      Latex Allergy:  [x]NO      []YES  Preferred Language for Healthcare:   [x]English       []other:    Pain level: 0/10 walking in.  3/10 with steps and getting in/out of the car     SUBJECTIVE:  He was sore after last tx. His knee was sore, but not his muscles. It feels the same. He needs to leave in about 30'. He would like to have a MRI if warranted before his plan year starts over on . OBJECTIVE:  22- TTP over inferior pole of patella. Less pain with medial force of patella with squat. Leukotape helped with pain today. Discussed using brace when he goes for walks. 22    Observation:     Test measurements:  TTP inferior pole of patella.        Effusion  RIGHT LEFT   10cm superior to patella     Midline patella     10cm inferior to patella Flexibility L R Comment   Hamstring   SLR   Gastroc      ITB      Quad                ROM PROM AROM Overpressure Comment    L R L R L R    Flexion    147      Extension    Hyper 1                               Strength L R Comment   Quad  4    Hamstring  4+    Gastroc      Hip flexor  4+    Hip ABD  4-                       RESTRICTIONS/PRECAUTIONS:     Exercises/Interventions:   Exercises:  Exercise/Equipment Resistance/Repetitions Other comments   Stretching     Hamstring 5x30    Hip Flexion 5x:30    ITB     Grion     Quad 5x:30    Inclined Calf     Towel Pull          SLR     Supine Cuing to avoid quad lag   Prone    Abduction    Adducton        SLR+ ABD 5x:30   SLR+ 4x:20   Clams 3x10 15#               Isometrics    Quad sets    Hip Adduction    Hamstring                Patellar Glides    Medial    Superior    Inferior        ROM    Sheet Pulls    Wall Slides    Edge of bed    Flexionator    Extensionator    Hang Weights    Ankle Pumps                        CKC    Calf raises    Wall sits    Step ups    Step up and over    Lateral Step Downs            Mini squats    CC TKE        Lateral band walks 5 laps pillar to pillar red loop   Side Planks    Half moon    Single leg flow    Tad Carlos into ball on wall 5x:05           Biodex-balance    Single leg stance 5x:30 Airex    Plyoback        Stool scoots     bridge SL SB HS Curls    Planks        PRE    Extension 3x10 32# ECL RANGE: 90/40   Flexion 3x10 32# SL RANGE: 0/90       Cable Column        Leg Press 5x:20 isometric red loop bandRANGE: 70/10       Bike 5' L1   Treadmill          Cone walks          Manual     McConnel taping for patellar tracking  pulled patella medially               Therapeutic Exercise and NMR EXR  [] (67010) Provided verbal/tactile cueing for activities related to strengthening, flexibility, endurance, ROM for improvements in LE, proximal hip, and core control with self care, mobility, lifting, ambulation.  [] (39158) Provided verbal/tactile cueing for activities related to improving balance, coordination, kinesthetic sense, posture, motor skill, proprioception  to assist with LE, proximal hip, and core control in self care, mobility, lifting, ambulation and eccentric single leg control. NMR and Therapeutic Activities:    [] (64675 or 63752) Provided verbal/tactile cueing for activities related to improving balance, coordination, kinesthetic sense, posture, motor skill, proprioception and motor activation to allow for proper function of core, proximal hip and LE with self care and ADLs  [] (45226) Gait Re-education- Provided training and instruction to the patient for proper LE, core and proximal hip recruitment and positioning and eccentric body weight control with ambulation re-education including up and down stairs     Home Exercise Program:    [x] (40430) Reviewed/Progressed HEP activities related to strengthening, flexibility, endurance, ROM of core, proximal hip and LE for functional self-care, mobility, lifting and ambulation/stair navigation   [] (37966)Reviewed/Progressed HEP activities related to improving balance, coordination, kinesthetic sense, posture, motor skill, proprioception of core, proximal hip and LE for self care, mobility, lifting, and ambulation/stair navigation      Manual Treatments:  PROM / STM / Oscillations-Mobs:  G-I, II, III, IV (PA's, Inf., Post.)  [] (20891) Provided manual therapy to mobilize LE, proximal hip and/or LS spine soft tissue/joints for the purpose of modulating pain, promoting relaxation,  increasing ROM, reducing/eliminating soft tissue swelling/inflammation/restriction, improving soft tissue extensibility and allowing for proper ROM for normal function with self care, mobility, lifting and ambulation. Modalities:   Ionto- with use of stat patch- dexamethasone. 8' with ed and set up.      Charges:   Timed Code Treatment Minutes: 30'   Total Treatment Minutes: 28'     [] DEYA (LOW) 52062   [] EVAL (MOD) 48273   [] EVAL (HIGH) 95546   [] RE-EVAL   [x] BS(93641) x 1   [] IONTO  [] NMR (52509) x    [] VASO  [] Manual (30026) x      [x] Other: ionto  [] TA x   [] Mech Traction (64125)  [] ES(attended) (04264)      [] ES (un) (15669): Access Code: [email or web address]  URL: [employer]/  Date: 11/29/2021  Prepared by: Oneal Llanes    Exercises  Standing Gastroc Stretch - 2 x daily - 7 x weekly - 5 reps - 30 hold  Seated Table Hamstring Stretch - 2 x daily - 7 x weekly - 1 sets - 5 reps - 30 hold  Long Sitting Calf Stretch with Strap - 2 x daily - 7 x weekly - 1 sets - 5 reps - 30 hold  Supine Heel Slide with Strap - 1 x daily - 7 x weekly - 3 sets - 10 reps  Straight Leg Raise - 2 x daily - 7 x weekly - 3 sets - 10 reps  Sidelying Hip Abduction - 2 x daily - 7 x weekly - 3 sets - 10 reps  Prone Hip Extension - 2 x daily - 7 x weekly - 3 sets - 10 reps  Sidelying Hip Adduction - 2 x daily - 7 x weekly - 3 sets - 10 reps  Clamshell with Resistance - 1 x daily - 3 x weekly - 3 sets - 10 reps  Standing Single Leg Heel Raise - 1 x daily - 3 x weekly - 3 sets - 10 reps  Single Leg Bridge - 1 x daily - 3 x weekly - 3 sets - 10 reps  Single Leg Stance - 1 x daily - 3 x weekly - 5 sets - 30 hold          GOALS:  Patient stated goal: return to walking without pain, return to golfing   [x] Progressing: [] Met: [] Not Met: [] Adjusted    Therapist goals for Patient:   Short Term Goals: To be achieved in: 2 weeks  1. Independent in HEP and progression per patient tolerance, in order to prevent re-injury. [] Progressing: [x] Met: [] Not Met: [] Adjusted   2. Patient will have a decrease in pain to facilitate improvement in movement, function, and ADLs as indicated by Functional Deficits. [] Progressing: [x] Met: [] Not Met: [] Adjusted    Long Term Goals: To be achieved in: 6-8 weeks  1. Disability index score of 20% or less for the LEFS to assist with reaching prior level of function. [x] Progressing: [] Met: [] Not Met: [] Adjusted   2. Patient will demonstrate increased AROM to 120+ flexion, 0+ extension  to allow for proper joint functioning as indicated by patients Functional Deficits. [] Progressing: [x] Met: [] Not Met: [] Adjusted  3. Patient will demonstrate an increase in Strength to 4/5 or greater  to allow for proper functional mobility as indicated by patients Functional Deficits. [x] Progressing: [] Met: [] Not Met: [] Adjusted  4. Patient will return to ambulation on all surfaces without increased symptoms or restriction. [] Progressing: [x] Met: [] Not Met: [] Adjusted  5. Patient will return to navigating stairs without increased symptoms or restriction. [x] Progressing: [] Met: [] Not Met: [] Adjusted        Overall Progression Towards Functional goals/ Treatment Progress Update:  [x] Patient is progressing as expected towards functional goals listed. [] Progression is slowed due to complexities/Impairments listed. [] Progression has been slowed due to co-morbidities. [] Plan just implemented, too soon to assess goals progression <30days   [] Goals require adjustment due to lack of progress  [] Patient is not progressing as expected and requires additional follow up with physician  [] Other    Prognosis for POC: [x] Good [] Fair  [] Poor      Patient requires continued skilled intervention: [x] Yes  [] No    Treatment/Activity Tolerance:  [x] Patient able to complete treatment  [] Patient limited by fatigue  [] Patient limited by pain     [] Patient limited by other medical complications  [] Other: Pt shan tx fair/well. Pt has no c/o pain with amb, but he does have pain with stairs and getting in/out of his car. He did no c/o pain t/o tx today. His symptoms do appear to be improving as we are able to do things in tx that are painfree; however, he continues to be frustrated with his pain with stairs/activities. Pt's tx was limited today due to time restraints. Pt would continue to benefit from skilled PT to improve strength, flexibility, pain, function, and return to PLOF. Return to Play: (if applicable)   []  Stage 1: Intro to Strength   []  Stage 2: Return to Run and Strength   []  Stage 3: Return to Jump and Strength   []  Stage 4: Dynamic Strength and Agility   []  Stage 5: Sport Specific Training     []  Ready to Return to Play, Meets All Above Stages   []  Not Ready for Return to Sport   Comments:                               PLAN:  Progress to more WB activities as listed in flowsheet. Continue ionto. Progress strength and progress to running in the Alter G at 6 months post op. Consider taking measurements NV. Monitor pt's pain. Consider continued use of taping/brace. Pt to come once next week, but he can call to come in sooner if needed. [x] Continue per plan of care [] Alter current plan (see comments above)  [] Plan of care initiated [] Hold pending MD visit [] Discharge  Note: If patient does not return for scheduled/ recommended follow up visits, this note will serve as a discharge from care along with most recent update on progress.         Electronically signed by:   Ananda Cox, DPT [id number]

## 2022-03-04 ENCOUNTER — HOSPITAL ENCOUNTER (OUTPATIENT)
Dept: PHYSICAL THERAPY | Age: 47
Setting detail: THERAPIES SERIES
Discharge: HOME | End: 2022-03-04
Payer: COMMERCIAL

## 2022-03-04 PROCEDURE — 97110 THERAPEUTIC EXERCISES: CPT | Performed by: PHYSICAL THERAPIST

## 2022-03-04 NOTE — FLOWSHEET NOTE
100 [facility] a Department of [address] [address] [address]  Office: [phone]  Fax:  [phone]             Physical Therapy Daily Treatment Note  Date:  3/4/2022    Patient Name:  Balaji Grove    :  1975  MRN: [id number]    Medical/Treatment Diagnosis Information:  · Diagnosis: M76.51 (ICD-10-CM) - Patellar tendinitis of right knee; sp plica removal and patellar debridement 11/10  · Treatment Diagnosis: M25.561 R knee pain  Insurance/Certification information:  PT Insurance Information: UMR  Physician Information:  Referring Practitioner: Bala Rankin  Has the plan of care been signed (Y/N):        []  Yes  [x]  No     Date of Patient follow up with Physician: prn      Is this a Progress Report:     [x]  Yes  []  No        Progress report will be due (10 Rx or 30 days whichever is less): 8 Mar 22       Recertification will be due (POC Duration  / 90 days whichever is less):          Visit # Insurance Allowable Auth Required   26 45 through 21-3/31/22 []  Yes [x]  No        Functional Scale: LEFS 72.5%     Date assessed:  22      Latex Allergy:  [x]NO      []YES  Preferred Language for Healthcare:   [x]English       []other:    Pain level: 0/10 walking in.  2/10 with steps and getting in/out of the car     SUBJECTIVE:  He continues to have pain on that pressure point. He has gone to the gym and lifted. He did lift knee ext yesterday. He has been walking and doing the elliptical.  He has been wearing the knee brace. OBJECTIVE:  22- TTP over inferior pole of patella. Less pain with medial force of patella with squat. Leukotape helped with pain today. Discussed using brace when he goes for walks. 22    Observation:     Test measurements:  TTP inferior pole of patella.        Effusion  RIGHT LEFT   10cm superior to patella     Midline patella     10cm inferior to patella Flexibility L R Comment   Hamstring   SLR   Gastroc      ITB      Quad                ROM PROM AROM Overpressure Comment    L R L R L R    Flexion    147      Extension    Hyper 1                               Strength L R Comment   Quad  4    Hamstring  4+    Gastroc      Hip flexor  4+    Hip ABD  4-                       RESTRICTIONS/PRECAUTIONS:     Exercises/Interventions:   Exercises:  Exercise/Equipment Resistance/Repetitions Other comments   Stretching     Hamstring 5x30    Hip Flexion 5x:30    ITB     Grion     Quad 5x:30    Inclined Calf     Towel Pull          SLR     Supine Cuing to avoid quad lag   Prone    Abduction    Adducton        SLR+ ABD    SLR+    Clams 3x10 20#               Isometrics    Quad sets    Hip Adduction    Hamstring                Patellar Glides    Medial    Superior    Inferior        ROM    Sheet Pulls    Wall Slides    Edge of bed    Flexionator    Extensionator    Hang Weights    Ankle Pumps                        CKC    Calf raises    Wall sits    Step ups    Step up and over    Lateral Step Downs            Mini squats    CC TKE        Lateral band walks    Side Planks    Half moon    Single leg flow    [place] into ball on wall            Biodex-balance    Single leg stance 5x:30    Plyoback        Stool scoots     bridge SL SB HS Curls    Planks        PRE    Extension  RANGE: 90/40   Flexion 3x10 32# SL RANGE: 0/90       Cable Column        Leg Press 3x10 60# ECL RANGE: 70/10  Progress NV       Bike    Treadmill          Cone walks          Manual     Nathan taping for patellar tracking  pulled patella medially  Pt ed on tape for home and how to tape his own               Therapeutic Exercise and NMR EXR  [] (92634) Provided verbal/tactile cueing for activities related to strengthening, flexibility, endurance, ROM for improvements in LE, proximal hip, and core control with self care, mobility, lifting, ambulation.  [] (11953) Provided verbal/tactile cueing for activities related to improving balance, coordination, kinesthetic sense, posture, motor skill, proprioception  to assist with LE, proximal hip, and core control in self care, mobility, lifting, ambulation and eccentric single leg control. NMR and Therapeutic Activities:    [] (58735 or 06730) Provided verbal/tactile cueing for activities related to improving balance, coordination, kinesthetic sense, posture, motor skill, proprioception and motor activation to allow for proper function of core, proximal hip and LE with self care and ADLs  [] (10464) Gait Re-education- Provided training and instruction to the patient for proper LE, core and proximal hip recruitment and positioning and eccentric body weight control with ambulation re-education including up and down stairs     Home Exercise Program:    [x] (45396) Reviewed/Progressed HEP activities related to strengthening, flexibility, endurance, ROM of core, proximal hip and LE for functional self-care, mobility, lifting and ambulation/stair navigation   [] (97189)Reviewed/Progressed HEP activities related to improving balance, coordination, kinesthetic sense, posture, motor skill, proprioception of core, proximal hip and LE for self care, mobility, lifting, and ambulation/stair navigation      Manual Treatments:  PROM / STM / Oscillations-Mobs:  G-I, II, III, IV (PA's, Inf., Post.)  [] (45500) Provided manual therapy to mobilize LE, proximal hip and/or LS spine soft tissue/joints for the purpose of modulating pain, promoting relaxation,  increasing ROM, reducing/eliminating soft tissue swelling/inflammation/restriction, improving soft tissue extensibility and allowing for proper ROM for normal function with self care, mobility, lifting and ambulation.      Modalities:       Charges:   Timed Code Treatment Minutes: 15'   Total Treatment Minutes: 25'     [] EVAL (LOW) 88348   [] EVAL (MOD) 76416   [] EVAL (HIGH) 52473   [] RE-EVAL   [x] MG(09214) x 1   [] IONTO  [] NMR (10736) x    [] VASO  [] Manual (18138) x      [] Other: ionto  [] TA x   [] Mech Traction (43171)  [] ES(attended) (27075)      [] ES (un) (43920): Access Code: [email or web address]  URL: [email or web address]  Date: 11/29/2021  Prepared by: Indira Llanes    Exercises  Standing Gastroc Stretch - 2 x daily - 7 x weekly - 5 reps - 30 hold  Seated Table Hamstring Stretch - 2 x daily - 7 x weekly - 1 sets - 5 reps - 30 hold  Long Sitting Calf Stretch with Strap - 2 x daily - 7 x weekly - 1 sets - 5 reps - 30 hold  Supine Heel Slide with Strap - 1 x daily - 7 x weekly - 3 sets - 10 reps  Straight Leg Raise - 2 x daily - 7 x weekly - 3 sets - 10 reps  Sidelying Hip Abduction - 2 x daily - 7 x weekly - 3 sets - 10 reps  Prone Hip Extension - 2 x daily - 7 x weekly - 3 sets - 10 reps  Sidelying Hip Adduction - 2 x daily - 7 x weekly - 3 sets - 10 reps  Clamshell with Resistance - 1 x daily - 3 x weekly - 3 sets - 10 reps  Standing Single Leg Heel Raise - 1 x daily - 3 x weekly - 3 sets - 10 reps  Single Leg Bridge - 1 x daily - 3 x weekly - 3 sets - 10 reps  Single Leg Stance - 1 x daily - 3 x weekly - 5 sets - 30 hold          GOALS:  Patient stated goal: return to walking without pain, return to golfing   [x] Progressing: [] Met: [] Not Met: [] Adjusted    Therapist goals for Patient:   Short Term Goals: To be achieved in: 2 weeks  1. Independent in HEP and progression per patient tolerance, in order to prevent re-injury. [] Progressing: [x] Met: [] Not Met: [] Adjusted   2. Patient will have a decrease in pain to facilitate improvement in movement, function, and ADLs as indicated by Functional Deficits. [] Progressing: [x] Met: [] Not Met: [] Adjusted    Long Term Goals: To be achieved in: 6-8 weeks  1. Disability index score of 20% or less for the LEFS to assist with reaching prior level of function. [x] Progressing: [] Met: [] Not Met: [] Adjusted   2.  Patient will demonstrate increased AROM to 120+ flexion, 0+ extension  to allow for proper joint functioning as indicated by patients Functional Deficits. [] Progressing: [x] Met: [] Not Met: [] Adjusted  3. Patient will demonstrate an increase in Strength to 4/5 or greater  to allow for proper functional mobility as indicated by patients Functional Deficits. [x] Progressing: [] Met: [] Not Met: [] Adjusted  4. Patient will return to ambulation on all surfaces without increased symptoms or restriction. [] Progressing: [x] Met: [] Not Met: [] Adjusted  5. Patient will return to navigating stairs without increased symptoms or restriction. [x] Progressing: [] Met: [] Not Met: [] Adjusted        Overall Progression Towards Functional goals/ Treatment Progress Update:  [x] Patient is progressing as expected towards functional goals listed. [] Progression is slowed due to complexities/Impairments listed. [] Progression has been slowed due to co-morbidities. [] Plan just implemented, too soon to assess goals progression <30days   [] Goals require adjustment due to lack of progress  [] Patient is not progressing as expected and requires additional follow up with physician  [] Other    Prognosis for POC: [x] Good [] Fair  [] Poor      Patient requires continued skilled intervention: [x] Yes  [] No    Treatment/Activity Tolerance:  [x] Patient able to complete treatment  [] Patient limited by fatigue  [] Patient limited by pain     [] Patient limited by other medical complications  [] Other: Pt shan tx well. Pt's time was significantly limited due to work being very chaotic currently. He has been able to be more active though currently including going for walks, golfing, and using the elliptical.  He would like to run, and I did advise him that the MD wanted to wait until 6 mos post op. We discussed that we would use the AlterG at that point. Pt is understanding. We did go over the taping for home.   We did tape today due to the pt forgetting his medication. Pt would continue to benefit from skilled PT to improve strength, flexibility, pain, function, and return to PLOF. Return to Play: (if applicable)   []  Stage 1: Intro to Strength   []  Stage 2: Return to Run and Strength   []  Stage 3: Return to Jump and Strength   []  Stage 4: Dynamic Strength and Agility   []  Stage 5: Sport Specific Training     []  Ready to Return to Play, Meets All Above Stages   []  Not Ready for Return to Sport   Comments:                               PLAN:  Progress to more WB activities as listed in flowsheet. Continue ionto. Progress strength and progress to running in the Alter G at 6 months post op. Consider taking measurements NV. Monitor pt's pain. Consider continued use of taping/brace. Pt to come once next week, but he can call to come in sooner if needed. Pt will continue to go to the gym on his own, and he will work on [employer]. He will see how the tape feels today. [x] Continue per plan of care [] Alter current plan (see comments above)  [] Plan of care initiated [] Hold pending MD visit [] Discharge  Note: If patient does not return for scheduled/ recommended follow up visits, this note will serve as a discharge from care along with most recent update on progress.         Electronically signed by:   Ananda Jacinto, [id number]

## 2022-03-11 ENCOUNTER — HOSPITAL ENCOUNTER (OUTPATIENT)
Dept: PHYSICAL THERAPY | Age: 47
Setting detail: THERAPIES SERIES
Discharge: HOME | End: 2022-03-11
Payer: COMMERCIAL

## 2022-03-11 PROCEDURE — 97110 THERAPEUTIC EXERCISES: CPT | Performed by: PHYSICAL THERAPIST

## 2022-03-11 PROCEDURE — 97033 APP MDLTY 1+IONTPHRSIS EA 15: CPT | Performed by: PHYSICAL THERAPIST

## 2022-03-11 NOTE — PLAN OF CARE
100 [facility] Performance [facility] Rehabilitation a Department of [address]  Santiago [address] [place] [address], [address]  Office: [phone]  Fax:  [phone]       Physical Therapy Re-Certification Plan of Care    Dear Dr. James Miranda,    We had the pleasure of treating the following patient for physical therapy services at [address] [place] [address]. A summary of our findings can be found in the updated assessment below. This includes our plan of care. If you have any questions or concerns regarding these findings, please do not hesitate to contact me at the office phone number checked above. Thank you for the referral.     Physician Signature:________________________________Date:__________________  By signing above (or electronic signature), therapists plan is approved by physician    Date Range Of Visits: 21-3/11/2022  Total Visits to Date: 32  Overall Response to Treatment:   [] Patient is responding well to treatment and improvement is noted with regards to goals   [] Patient should continue to improve in reasonable time if they continue HEP   [] Patient has plateaued and is no longer responding to skilled PT intervention    [] Patient is getting worse and would benefit from return to referring MD   [] Patient unable to adhere to initial POC   [x] Other: Pt shan tx well. His area of pain to palpate does seem to be getting smaller; however, he does continue to have pain with stairs and getting out of the car. He has been able to perform cardio exercises though. He did have soreness at the end of his tx today. Pt would continue to benefit from skilled PT to improve strength, flexibility, pain, function, and return to PLOF. Recommend continuing tx 1x/wk over the next month and re-assess.             Physical Therapy Daily Treatment Note  Date:  3/11/2022    Patient Name:  Bang Andres    :  1975  MRN: [id number]    Medical/Treatment Diagnosis Information:  · Diagnosis: M76.51 (ICD-10-CM) - Patellar tendinitis of right knee; sp plica removal and patellar debridement 11/10  · Treatment Diagnosis: M25.561 R knee pain  Insurance/Certification information:  PT Insurance Information: UMR  Physician Information:  Referring Practitioner: Omar Dukes  Has the plan of care been signed (Y/N):        []  Yes  [x]  No     Date of Patient follow up with Physician: prn      Is this a Progress Report:     [x]  Yes  []  No        Progress report will be due (10 Rx or 30 days whichever is less): 8 Apr 22       Recertification will be due (POC Duration  / 90 days whichever is less):          Visit # Insurance Allowable Auth Required   27 45 through 4/1/21-3/31/22 []  Yes [x]  No        Functional Scale: LEFS 75%     Date assessed:  3/11/22      Latex Allergy:  [x]NO      []YES  Preferred Language for Healthcare:   [x]English       []other:    Pain level: 0/10 walking in. SUBJECTIVE:  His pain at worst is 3/10. He continues to be tight in the morning. He has pain getting in/out of the car. He can walk and do the elliptical without pain. He continues to be point tender on the front of the knee. He feels it is the exact same as before surgery. The taping helped a bit. He has been wearing the brace with activities. He has been able to do cardio including the elliptical and going for walks. OBJECTIVE:  3/11/22     Observation:     Test measurements:  TTP inferior pole of patella.        Effusion  RIGHT LEFT   10cm superior to patella     Midline patella     10cm inferior to patella       Flexibility L R Comment   Hamstring   SLR   Gastroc      ITB      Quad                ROM PROM AROM Overpressure Comment    L R L R L R    Flexion    149      Extension    0                              Strength L R Comment   Quad  4    Hamstring  4+    Gastroc      Hip flexor  4+    Hip ABD 4- 4-                       RESTRICTIONS/PRECAUTIONS: Exercises/Interventions:   Exercises:  Exercise/Equipment Resistance/Repetitions Other comments   Stretching     Hamstring 5x30    Hip Flexion 5x:30    ITB     Grion     Quad 5x:30    Inclined Calf     Towel Pull          SLR     Supine 3x10 6# Cuing to avoid quad lag   Prone 3x10 6#    Abduction 3x10 6#    Adducton 3x10 6#        SLR+ ABD    SLR+    Clams                Isometrics    Quad sets    Hip Adduction    Hamstring                Patellar Glides    Medial    Superior    Inferior        ROM    Sheet Pulls    Wall Slides    Edge of bed    Flexionator    Extensionator    Hang Weights    Ankle Pumps                        CKC    Calf raises    Wall sits    Step ups    Step up and over    Lateral Step Downs            Mini squats    CC TKE        Lateral band walks 3 laps pillar to pillar blue loop   Side Planks    Half moon    Single leg flow    Samantha Barrio into ball on wall            Biodex-balance    Single leg stance    Plyoback        Stool scoots     bridge SL SB HS Curls    Planks        PRE    Extension 3x10 32# ECL RANGE: 90/40   Flexion 3x10 32# SL RANGE: 0/90       Cable Column        Leg Press 3x10 85# ECL RANGE: 70/10  Progress NV       Bike    Treadmill          Cone walks          Manual     McConnel taping for patellar tracking            Therapeutic Exercise and NMR EXR  [] (76243) Provided verbal/tactile cueing for activities related to strengthening, flexibility, endurance, ROM for improvements in LE, proximal hip, and core control with self care, mobility, lifting, ambulation.  [] (32027) Provided verbal/tactile cueing for activities related to improving balance, coordination, kinesthetic sense, posture, motor skill, proprioception  to assist with LE, proximal hip, and core control in self care, mobility, lifting, ambulation and eccentric single leg control.      NMR and Therapeutic Activities:    [] (71533 or ) Provided verbal/tactile cueing for activities related to improving balance, coordination, kinesthetic sense, posture, motor skill, proprioception and motor activation to allow for proper function of core, proximal hip and LE with self care and ADLs  [] (51461) Gait Re-education- Provided training and instruction to the patient for proper LE, core and proximal hip recruitment and positioning and eccentric body weight control with ambulation re-education including up and down stairs     Home Exercise Program:    [x] (11905) Reviewed/Progressed HEP activities related to strengthening, flexibility, endurance, ROM of core, proximal hip and LE for functional self-care, mobility, lifting and ambulation/stair navigation   [] (99329)Reviewed/Progressed HEP activities related to improving balance, coordination, kinesthetic sense, posture, motor skill, proprioception of core, proximal hip and LE for self care, mobility, lifting, and ambulation/stair navigation      Manual Treatments:  PROM / STM / Oscillations-Mobs:  G-I, II, III, IV (PA's, Inf., Post.)  [] (72578) Provided manual therapy to mobilize LE, proximal hip and/or LS spine soft tissue/joints for the purpose of modulating pain, promoting relaxation,  increasing ROM, reducing/eliminating soft tissue swelling/inflammation/restriction, improving soft tissue extensibility and allowing for proper ROM for normal function with self care, mobility, lifting and ambulation. Modalities:   Ionto- with use of stat patch- dexamethasone. 8' with ed and set up. Charges:   Timed Code Treatment Minutes: 23'   Total Treatment Minutes: 28'     [] EVAL (LOW) 455 1011   [] EVAL (MOD) 64240   [] EVAL (HIGH) 35869   [] RE-EVAL   [x] XS(40465) x 1   [] IONTO  [] NMR (24251) x    [] VASO  [] Manual (67020) x      [x] Other: ionto  [] TA x1   [] Mech Traction (56040)  [] ES(attended) (02938)      [] ES (un) (08727): Access Code: [email or web address]  URL: [email or web address]  Date: 11/29/2021  Prepared by: Ophelia Sykes  Standing Gastroc Stretch - 2 x daily - 7 x weekly - 5 reps - 30 hold  Seated Table Hamstring Stretch - 2 x daily - 7 x weekly - 1 sets - 5 reps - 30 hold  Long Sitting Calf Stretch with Strap - 2 x daily - 7 x weekly - 1 sets - 5 reps - 30 hold  Supine Heel Slide with Strap - 1 x daily - 7 x weekly - 3 sets - 10 reps  Straight Leg Raise - 2 x daily - 7 x weekly - 3 sets - 10 reps  Sidelying Hip Abduction - 2 x daily - 7 x weekly - 3 sets - 10 reps  Prone Hip Extension - 2 x daily - 7 x weekly - 3 sets - 10 reps  Sidelying Hip Adduction - 2 x daily - 7 x weekly - 3 sets - 10 reps  Clamshell with Resistance - 1 x daily - 3 x weekly - 3 sets - 10 reps  Standing Single Leg Heel Raise - 1 x daily - 3 x weekly - 3 sets - 10 reps  Single Leg Bridge - 1 x daily - 3 x weekly - 3 sets - 10 reps  Single Leg Stance - 1 x daily - 3 x weekly - 5 sets - 30 hold          GOALS:  Patient stated goal: return to walking without pain, return to golfing   [x] Progressing: [] Met: [] Not Met: [] Adjusted    Therapist goals for Patient:   Short Term Goals: To be achieved in: 2 weeks  1. Independent in HEP and progression per patient tolerance, in order to prevent re-injury. [] Progressing: [x] Met: [] Not Met: [] Adjusted   2. Patient will have a decrease in pain to facilitate improvement in movement, function, and ADLs as indicated by Functional Deficits. [] Progressing: [x] Met: [] Not Met: [] Adjusted    Long Term Goals: To be achieved in: 6-8 weeks  1. Disability index score of 20% or less for the LEFS to assist with reaching prior level of function. [x] Progressing: [] Met: [] Not Met: [] Adjusted   2. Patient will demonstrate increased AROM to 120+ flexion, 0+ extension  to allow for proper joint functioning as indicated by patients Functional Deficits. [] Progressing: [x] Met: [] Not Met: [] Adjusted  3.  Patient will demonstrate an increase in Strength to 4/5 or greater  to allow for proper functional mobility as indicated by patients Functional Deficits. [x] Progressing: [] Met: [] Not Met: [] Adjusted  4. Patient will return to ambulation on all surfaces without increased symptoms or restriction. [] Progressing: [x] Met: [] Not Met: [] Adjusted  5. Patient will return to navigating stairs without increased symptoms or restriction. [x] Progressing: [] Met: [] Not Met: [] Adjusted        Overall Progression Towards Functional goals/ Treatment Progress Update:  [x] Patient is progressing as expected towards functional goals listed. [] Progression is slowed due to complexities/Impairments listed. [] Progression has been slowed due to co-morbidities. [] Plan just implemented, too soon to assess goals progression <30days   [] Goals require adjustment due to lack of progress  [] Patient is not progressing as expected and requires additional follow up with physician  [] Other    Prognosis for POC: [x] Good [] Fair  [] Poor      Patient requires continued skilled intervention: [x] Yes  [] No    Treatment/Activity Tolerance:  [x] Patient able to complete treatment  [] Patient limited by fatigue  [] Patient limited by pain     [] Patient limited by other medical complications  [] Other: Pt shan tx well. His area of pain to palpate does seem to be getting smaller; however, he does continue to have pain with stairs and getting out of the car. He has been able to perform cardio exercises though. He did have soreness at the end of his tx today. Pt would continue to benefit from skilled PT to improve strength, flexibility, pain, function, and return to PLOF. Recommend continuing tx 1x/wk over the next month and re-assess.          Return to Play: (if applicable)   []  Stage 1: Intro to Strength   []  Stage 2: Return to Run and Strength   []  Stage 3: Return to Jump and Strength   []  Stage 4: Dynamic Strength and Agility   []  Stage 5: Sport Specific Training     []  Ready to Return to Play, Meets All Above Stages   []  Not Ready for Return to Sport   Comments:                               PLAN:  Progress to more WB activities as listed in flowsheet. Continue ionto. Progress strength and progress to running in the Alter G at 6 months post op. Pt to call MD for MRI scheduling if he feels he needs it. [x] Continue per plan of care [] Alter current plan (see comments above)  [] Plan of care initiated [] Hold pending MD visit [] Discharge  Note: If patient does not return for scheduled/ recommended follow up visits, this note will serve as a discharge from care along with most recent update on progress.         Electronically signed by:   Ananda Bryson, DPT [id number]

## 2022-03-23 ENCOUNTER — HOSPITAL ENCOUNTER (OUTPATIENT)
Dept: PHYSICAL THERAPY | Age: 47
Setting detail: THERAPIES SERIES
Discharge: HOME | End: 2022-03-23
Payer: COMMERCIAL

## 2022-03-23 PROCEDURE — 97110 THERAPEUTIC EXERCISES: CPT | Performed by: PHYSICAL THERAPIST

## 2022-03-23 PROCEDURE — 97530 THERAPEUTIC ACTIVITIES: CPT | Performed by: PHYSICAL THERAPIST

## 2022-03-23 NOTE — FLOWSHEET NOTE
100 [facility] Performance and [facility] a Department of [address]  [facility] [place] [address]  Office: [phone]  Fax:  [phone]             Physical Therapy Daily Treatment Note  Date:  3/23/2022    Patient Name:  Napoleon Guo    :  1975  MRN: [id number]    Medical/Treatment Diagnosis Information:  · Diagnosis: M76.51 (ICD-10-CM) - Patellar tendinitis of right knee; sp plica removal and patellar debridement 11/10  · Treatment Diagnosis: M25.561 R knee pain  Insurance/Certification information:  PT Insurance Information: UMR  Physician Information:  Referring Practitioner: Fernando Kinsey  Has the plan of care been signed (Y/N):        []  Yes  [x]  No     Date of Patient follow up with Physician: prn      Is this a Progress Report:     [x]  Yes  []  No        Progress report will be due (10 Rx or 30 days whichever is less):        Recertification will be due (POC Duration  / 90 days whichever is less):          Visit # Insurance Allowable Auth Required   28 45 through 21-3/31/22 []  Yes [x]  No        Functional Scale: LEFS 75%     Date assessed:  3/11/22      Latex Allergy:  [x]NO      []YES  Preferred Language for Healthcare:   [x]English       []other:    Pain level: 0/10 walking in. SUBJECTIVE:  He doesn't feel like it's getting any better. He continues to have pain with single leg activities like steps or getting in/out of the car. OBJECTIVE:  3/23/22-TTP over inferior pole of patella    3/11/22     Observation:     Test measurements:  TTP inferior pole of patella.        Effusion  RIGHT LEFT   10cm superior to patella     Midline patella     10cm inferior to patella       Flexibility L R Comment   Hamstring   SLR   Gastroc      ITB      Quad                ROM PROM AROM Overpressure Comment    L R L R L R    Flexion    149      Extension    0                              Strength L R Comment   Quad  4    Hamstring 4+    Gastroc      Hip flexor  4+    Hip ABD 4- 4-                       RESTRICTIONS/PRECAUTIONS:     Exercises/Interventions:   Exercises:  Exercise/Equipment Resistance/Repetitions Other comments   Stretching     Hamstring 5x30    Hip Flexion 5x:30    ITB     Grion     Quad 5x:30    Inclined Calf     Towel Pull          SLR     Supine Cuing to avoid quad lag   Prone    Abduction    Adducton        SLR+ ABD    SLR+    Clams                Isometrics    Quad sets    Hip Adduction    Hamstring                Patellar Glides    Medial    Superior    Inferior        ROM    Sheet Pulls    Wall Slides    Edge of bed    Flexionator    Extensionator    Hang Weights    Ankle Pumps                        CKC    Calf raises    Wall sits    Step ups    Step up and over    Lateral Step Downs            Mini squats    CC TKE        Lateral band walks 5 laps pillar to pillar blue loop   Side Planks    Half moon    Single leg flow    Pinky Ham into ball on wall            Biodex-balance    Single leg stance    Plyoback        Stool scoots     bridge SL SB HS Curls    Planks        PRE    Extension 3x10 40# ECL RANGE: 90/40   Flexion 3x10 32# SL RANGE: 0/90       Cable Column        Leg Press 3x10 85# ECL RANGE: 70/10  Progress NV       Bike 5' L1   Treadmill          Cone walks          Manual     McConnel taping for patellar tracking            Therapeutic Exercise and NMR EXR  [] (44770) Provided verbal/tactile cueing for activities related to strengthening, flexibility, endurance, ROM for improvements in LE, proximal hip, and core control with self care, mobility, lifting, ambulation.  [] (19055) Provided verbal/tactile cueing for activities related to improving balance, coordination, kinesthetic sense, posture, motor skill, proprioception  to assist with LE, proximal hip, and core control in self care, mobility, lifting, ambulation and eccentric single leg control.      NMR and Therapeutic Activities:    [] (18586 or 43990) Provided verbal/tactile cueing for activities related to improving balance, coordination, kinesthetic sense, posture, motor skill, proprioception and motor activation to allow for proper function of core, proximal hip and LE with self care and ADLs  [] (35483) Gait Re-education- Provided training and instruction to the patient for proper LE, core and proximal hip recruitment and positioning and eccentric body weight control with ambulation re-education including up and down stairs     Home Exercise Program:    [x] (62030) Reviewed/Progressed HEP activities related to strengthening, flexibility, endurance, ROM of core, proximal hip and LE for functional self-care, mobility, lifting and ambulation/stair navigation   [] (16258)Reviewed/Progressed HEP activities related to improving balance, coordination, kinesthetic sense, posture, motor skill, proprioception of core, proximal hip and LE for self care, mobility, lifting, and ambulation/stair navigation      Manual Treatments:  PROM / STM / Oscillations-Mobs:  G-I, II, III, IV (PA's, Inf., Post.)  [] (14650) Provided manual therapy to mobilize LE, proximal hip and/or LS spine soft tissue/joints for the purpose of modulating pain, promoting relaxation,  increasing ROM, reducing/eliminating soft tissue swelling/inflammation/restriction, improving soft tissue extensibility and allowing for proper ROM for normal function with self care, mobility, lifting and ambulation. Modalities:       Charges:   Timed Code Treatment Minutes: 30'   Total Treatment Minutes: 39'     [] EVAL (LOW) 455 1011   [] EVAL (MOD) 82166   [] EVAL (HIGH) 37526   [] RE-EVAL   [x] SB(07335) x 1   [] IONTO  [] NMR (61300) x    [] VASO  [] Manual (20936) x      [] Other: ionto  [x] TA x1   [] [facility] T[facility] (85241)  [] ES(attended) (16726)      [] ES (un) (84215): Access Code: [email or web address]  URL: [email or web address]  Date: 11/29/2021  Prepared by: Ophelia Sykes  Standing Gastroc Stretch - 2 x daily - 7 x weekly - 5 reps - 30 hold  Seated Table Hamstring Stretch - 2 x daily - 7 x weekly - 1 sets - 5 reps - 30 hold  Long Sitting Calf Stretch with Strap - 2 x daily - 7 x weekly - 1 sets - 5 reps - 30 hold  Supine Heel Slide with Strap - 1 x daily - 7 x weekly - 3 sets - 10 reps  Straight Leg Raise - 2 x daily - 7 x weekly - 3 sets - 10 reps  Sidelying Hip Abduction - 2 x daily - 7 x weekly - 3 sets - 10 reps  Prone Hip Extension - 2 x daily - 7 x weekly - 3 sets - 10 reps  Sidelying Hip Adduction - 2 x daily - 7 x weekly - 3 sets - 10 reps  Clamshell with Resistance - 1 x daily - 3 x weekly - 3 sets - 10 reps  Standing Single Leg Heel Raise - 1 x daily - 3 x weekly - 3 sets - 10 reps  Single Leg Bridge - 1 x daily - 3 x weekly - 3 sets - 10 reps  Single Leg Stance - 1 x daily - 3 x weekly - 5 sets - 30 hold          GOALS:  Patient stated goal: return to walking without pain, return to golfing   [x] Progressing: [] Met: [] Not Met: [] Adjusted    Therapist goals for Patient:   Short Term Goals: To be achieved in: 2 weeks  1. Independent in HEP and progression per patient tolerance, in order to prevent re-injury. [] Progressing: [x] Met: [] Not Met: [] Adjusted   2. Patient will have a decrease in pain to facilitate improvement in movement, function, and ADLs as indicated by Functional Deficits. [] Progressing: [x] Met: [] Not Met: [] Adjusted    Long Term Goals: To be achieved in: 6-8 weeks  1. Disability index score of 20% or less for the LEFS to assist with reaching prior level of function. [x] Progressing: [] Met: [] Not Met: [] Adjusted   2. Patient will demonstrate increased AROM to 120+ flexion, 0+ extension  to allow for proper joint functioning as indicated by patients Functional Deficits. [] Progressing: [x] Met: [] Not Met: [] Adjusted  3.  Patient will demonstrate an increase in Strength to 4/5 or greater  to allow for proper functional mobility as indicated by patients Functional Deficits. [x] Progressing: [] Met: [] Not Met: [] Adjusted  4. Patient will return to ambulation on all surfaces without increased symptoms or restriction. [] Progressing: [x] Met: [] Not Met: [] Adjusted  5. Patient will return to navigating stairs without increased symptoms or restriction. [x] Progressing: [] Met: [] Not Met: [] Adjusted        Overall Progression Towards Functional goals/ Treatment Progress Update:  [x] Patient is progressing as expected towards functional goals listed. [] Progression is slowed due to complexities/Impairments listed. [] Progression has been slowed due to co-morbidities. [] Plan just implemented, too soon to assess goals progression <30days   [] Goals require adjustment due to lack of progress  [] Patient is not progressing as expected and requires additional follow up with physician  [] Other    Prognosis for POC: [x] Good [] Fair  [] Poor      Patient requires continued skilled intervention: [x] Yes  [] No    Treatment/Activity Tolerance:  [x] Patient able to complete treatment  [] Patient limited by fatigue  [] Patient limited by pain     [] Patient limited by other medical complications  [] Other: Pt shan tx well. He did have pain when tension was released on quad exercises. We did discuss different options for tx as the pt continues to have pain including, but not limited to getting a MRI, returning to the MD for f/u, trying anti-inflammatory under MD guidance, continuing HEP on his own, continuing formal PT, or continuing HEP on own with a touch base point at 6 mos post op. Pt does wish to continue as HEP on his own with a f/u at 6 mos post op to discuss return to running and a check in. He also opted for discussing anti-inflammatories with MD.  I will facilitate this.          Return to Play: (if applicable)   []  Stage 1: Intro to Strength   []  Stage 2: Return to Run and Strength   []  Stage 3: Return to Jump and Strength   []  Stage 4: Dynamic Strength and Agility   []  Stage 5: Sport Specific Training     []  Ready to Return to Play, Meets All Above Stages   []  Not Ready for Return to Sport   Comments:                               PLAN:  Progress to more WB activities as listed in flowsheet. Continue ionto. Progress strength and progress to running in the Alter G at 6 months post op. Pt to f/u in a couple months or call/ email with questions. [x] Continue per plan of care [] Alter current plan (see comments above)  [] Plan of care initiated [] Hold pending MD visit [] Discharge  Note: If patient does not return for scheduled/ recommended follow up visits, this note will serve as a discharge from care along with most recent update on progress.         Electronically signed by:   Jag Blount, [address] [facility], DPT [id number]

## 2022-03-24 ENCOUNTER — TELEPHONE (OUTPATIENT)
Dept: ORTHOPEDIC SURGERY | Age: 47
End: 2022-03-24

## 2022-03-24 DIAGNOSIS — M76.51 PATELLAR TENDINITIS OF RIGHT KNEE: Primary | ICD-10-CM

## 2022-03-24 RX ORDER — DICLOFENAC SODIUM 75 MG/1
75 TABLET, DELAYED RELEASE ORAL 2 TIMES DAILY WITH MEALS
Qty: 60 TABLET | Refills: 0 | Status: SHIPPED | OUTPATIENT
Start: 2022-03-24 | End: 2022-10-14

## 2022-03-24 RX ORDER — SERTRALINE HYDROCHLORIDE 100 MG/1
TABLET, FILM COATED ORAL
COMMUNITY
Start: 2022-03-16

## 2022-03-24 NOTE — TELEPHONE ENCOUNTER
Patient in physical therapy yesterday 3-23-22 with Tavo Medina PT. They discussed patient trying a NSAID for his right knee. Spoke with Dr. Jennifer Tomlinson. Per Dr. Jennifer Tomlinson, order Diclofenac 75 mg PO BID. Rx sent to pharmacy. Left message for patient.

## 2022-05-20 ENCOUNTER — OFFICE VISIT (OUTPATIENT)
Dept: ORTHOPEDIC SURGERY | Age: 47
End: 2022-05-20
Payer: COMMERCIAL

## 2022-05-20 DIAGNOSIS — M25.561 ACUTE PAIN OF RIGHT KNEE: ICD-10-CM

## 2022-05-20 DIAGNOSIS — M76.51 PATELLAR TENDINITIS OF RIGHT KNEE: Primary | ICD-10-CM

## 2022-05-20 PROCEDURE — 99212 OFFICE O/P EST SF 10 MIN: CPT | Performed by: ORTHOPAEDIC SURGERY

## 2022-05-20 RX ORDER — BETAMETHASONE DIPROPIONATE 0.5 MG/G
CREAM TOPICAL
COMMUNITY
Start: 2022-05-18 | End: 2022-05-20

## 2022-05-20 NOTE — PROGRESS NOTES
Hemal Jeffries returns today to follow-up his right knee. He is very frustrated. He says he just does not feel any better than he did preoperatively. Surgery was back in November 2021. He was diligent in his therapy. We were routinely able to get his symptoms under control with anti-inflammatory especially with a Medrol Dosepak but his symptoms have become very limiting. He can no longer kneel and doing yard work as quite painful. I have reviewed the patient's medical history in detail and updated the computerized patient record. General Exam:    Vitals: There were no vitals taken for this visit. Constitutional: Patient is adequately groomed with no evidence of malnutrition  Mental Status: The patient is oriented to time, place and person. The patient's mood and affect are appropriate. Right knee has exquisite tenderness along the patella tendon at its insertion point. He has fullness in that region. He has full range of motion and good quad strength but setting the quad and straight leg raise reproduce discomfort. Assessment: Refractory right patella tendon pain with concern for recurrent injury versus nonhealing    Plan: MRI right knee. Follow-up with me after the scan                                                                                                                                           This note was created using voice recognition software. It has been proofread, but occasionally errors remain. Please disregard these errors. They will be corrected as they are noted.

## 2022-05-24 ENCOUNTER — OFFICE VISIT (OUTPATIENT)
Dept: ORTHOPEDIC SURGERY | Age: 47
End: 2022-05-24
Payer: COMMERCIAL

## 2022-05-24 VITALS — WEIGHT: 232 LBS | HEIGHT: 74 IN | BODY MASS INDEX: 29.77 KG/M2

## 2022-05-24 DIAGNOSIS — M76.51 PATELLAR TENDINITIS OF RIGHT KNEE: Primary | ICD-10-CM

## 2022-05-24 DIAGNOSIS — M25.561 ACUTE PAIN OF RIGHT KNEE: ICD-10-CM

## 2022-05-24 PROCEDURE — 99213 OFFICE O/P EST LOW 20 MIN: CPT | Performed by: ORTHOPAEDIC SURGERY

## 2022-05-24 NOTE — PROGRESS NOTES
Franklin Chavis turns today to follow-up his right knee. Pains about 3 out of 10 today. We obtained his MRI. General Exam:    Vitals: Height 6' 1.5\" (1.867 m), weight 232 lb (105.2 kg). Constitutional: Patient is adequately groomed with no evidence of malnutrition  Mental Status: The patient is oriented to time, place and person. The patient's mood and affect are appropriate. Right knee today has moderate tenderness anteriorly over the patella with some level of mild hypersensitivity. Patella tendon is clearly intact. He has good strength. He has no pain at rest.    Right knee MRI is reviewed. It demonstrates  FINDINGS:  Swelling in the knee notch.  Intact ACL and PCL.       Mildly thickened MCL compatible chronic sprain without tear.  Intact FCL.  Intact mildly    thickened ITB.       No medial or lateral meniscus tear.  Mild weight-bearing chondromalacia.       Intact quadriceps tendon.  Diffuse moderate hypertrophic tendinopathy of the patellar tendon    with residual 5 x 8 mm interstitial tear near the proximal posterior surface.  Postsurgical    artifact throughout the patellar tendon. .  No sign of abscess or sinus tract.  Mild reactive    bone edema in the inferior pole of the patella.       No [place] or [place].  Mild lateral patellar tilt without subluxation.  Mild patellofemoral    chondromalacia.       Small effusion.  Tendinopathy at the medial gastrocnemius origin.  Slit-like nondistended    unruptured Baker's cyst.  No loose body.  No acute macro fracture.       CONCLUSION:   1. Worsening moderate hypertrophic patellar tendinopathy with residual interstitial tear,    slightly larger than prior, but no retracted tear. Mild reactive bone edema in the inferior    pole of the patella, no stress fracture. 2. Mild tricompartment chondromalacia.  Similar to prior. 3. Chronic MCL sprain without tear. 4. Iliotibial band friction syndrome or strain without tear.      I personally interpreted the images as well and compared them to a scan from August 20, 2021. I demonstrated the changes in the tendon as well as the removal of his spur. 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 I believe this represents nonhealing of his tendon postsurgically. Plan: At this point he is going to try some over-the-counter CBD cream with [place] to decrease his sensitivity. He will continue with his rehab exercises and use of a brace for activities. Inclusion of the baseball season we will revisit the option of revision surgical treatment with a longer period of immobilization. He agrees with this plan and all questions have been answered.

## 2022-07-08 DIAGNOSIS — M25.561 ACUTE PAIN OF RIGHT KNEE: ICD-10-CM

## 2022-07-08 DIAGNOSIS — M76.51 PATELLAR TENDINITIS OF RIGHT KNEE: ICD-10-CM

## 2022-07-08 RX ORDER — METHYLPREDNISOLONE 4 MG/1
TABLET ORAL
Qty: 1 KIT | Refills: 0 | Status: SHIPPED | OUTPATIENT
Start: 2022-07-08 | End: 2022-07-14

## 2022-09-02 ENCOUNTER — OFFICE VISIT (OUTPATIENT)
Dept: ORTHOPEDIC SURGERY | Age: 47
End: 2022-09-02
Payer: COMMERCIAL

## 2022-09-02 VITALS — WEIGHT: 232 LBS | BODY MASS INDEX: 29.77 KG/M2 | HEIGHT: 74 IN

## 2022-09-02 DIAGNOSIS — M76.51 PATELLAR TENDINITIS OF RIGHT KNEE: ICD-10-CM

## 2022-09-02 DIAGNOSIS — M25.561 ACUTE PAIN OF RIGHT KNEE: Primary | ICD-10-CM

## 2022-09-02 PROCEDURE — 99212 OFFICE O/P EST SF 10 MIN: CPT | Performed by: ORTHOPAEDIC SURGERY

## 2022-09-02 NOTE — PROGRESS NOTES
Sara Escalante is today for his right knee. He still gets pain that is about 3 out of 10. He is really frustrated because sometimes it sharp and it limits his golfing. He now has to take a cart most of the time. He wants to proceed with repeat surgery. General Exam:    Vitals: Height 6' 1.5\" (1.867 m), weight 232 lb (105.2 kg). Constitutional: Patient is adequately groomed with no evidence of malnutrition  Mental Status: The patient is oriented to time, place and person. The patient's mood and affect are appropriate. Right knee today has tenderness at the inferior pole the patella and the patella tendon no intra-articular pain and no joint line pain. He has no effusion. He has full range of motion. Right knee MRI is again reviewed which demonstrates:        Exam Date: 05/23/2022   Exam Description: MR Right Knee w/o Contrast            HISTORY:  Patellar tendinitis. Knee pain. Surgery 11/10/2021 for tendon debridement and plica    removal.       TECHNICAL FACTORS:  Long- and short-axis fat- and water-weighted images were performed. COMPARISON:  August 20, 2021. FINDINGS:  Swelling in the knee notch. Intact ACL and PCL. Mildly thickened MCL compatible chronic sprain without tear. Intact FCL. Intact mildly    thickened ITB. No medial or lateral meniscus tear. Mild weight-bearing chondromalacia. Intact quadriceps tendon. Diffuse moderate hypertrophic tendinopathy of the patellar tendon    with residual 5 x 8 mm interstitial tear near the proximal posterior surface. Postsurgical    artifact throughout the patellar tendon. .  No sign of abscess or sinus tract. Mild reactive    bone edema in the inferior pole of the patella. No [place] or [place]. Mild lateral patellar tilt without subluxation. Mild patellofemoral    chondromalacia. Small effusion. Tendinopathy at the medial gastrocnemius origin.   Slit-like nondistended    unruptured Baker's cyst.  No loose body.  No acute macro fracture. CONCLUSION:   1. Worsening moderate hypertrophic patellar tendinopathy with residual interstitial tear,    slightly larger than prior, but no retracted tear. Mild reactive bone edema in the inferior    pole of the patella, no stress fracture. 2. Mild tricompartment chondromalacia. Similar to prior. 3. Chronic MCL sprain without tear. 4. Iliotibial band friction syndrome or strain without tear. At this point were to proceed with right knee open patella tendon debridement and repair. Reminded him that we will be slowing his recovery down dramatically compared to his last operation which was unsuccessful. I will see him back a few weeks prior to surgery in November.

## 2022-10-14 ENCOUNTER — OFFICE VISIT (OUTPATIENT)
Dept: ORTHOPEDIC SURGERY | Age: 47
End: 2022-10-14
Payer: COMMERCIAL

## 2022-10-14 VITALS — HEIGHT: 74 IN | WEIGHT: 232 LBS | RESPIRATION RATE: 12 BRPM | BODY MASS INDEX: 29.77 KG/M2

## 2022-10-14 DIAGNOSIS — M76.51 PATELLAR TENDINITIS OF RIGHT KNEE: Primary | ICD-10-CM

## 2022-10-14 PROCEDURE — 99213 OFFICE O/P EST LOW 20 MIN: CPT | Performed by: ORTHOPAEDIC SURGERY

## 2022-10-14 NOTE — PROGRESS NOTES
Brittany Isaac returns today to follow-up his right knee. He is really been doing well over the last several weeks. He will not running for 3 miles on the treadmill without difficulty. He still gets intermittent sharp pain with stairs but oftentimes is only minimally symptomatic. General Exam:    Vitals: Resp. rate 12, height 6' 1.5\" (1.867 m), weight 232 lb (105.2 kg). Constitutional: Patient is adequately groomed with no evidence of malnutrition  Mental Status: The patient is oriented to time, place and person. The patient's mood and affect are appropriate. Right knee has fullness over the patella tendon and tenderness at the inferior pole but full motion with no effusion or joint line pain. MRI right knee is again reviewed. It demonstrates      Exam Date: 05/23/2022   Exam Description: MR Right Knee w/o Contrast            HISTORY:  Patellar tendinitis. Knee pain. Surgery 11/10/2021 for tendon debridement and plica    removal.       TECHNICAL FACTORS:  Long- and short-axis fat- and water-weighted images were performed. COMPARISON:  August 20, 2021. FINDINGS:  Swelling in the knee notch. Intact ACL and PCL. Mildly thickened MCL compatible chronic sprain without tear. Intact FCL. Intact mildly    thickened ITB. No medial or lateral meniscus tear. Mild weight-bearing chondromalacia. Intact quadriceps tendon. Diffuse moderate hypertrophic tendinopathy of the patellar tendon    with residual 5 x 8 mm interstitial tear near the proximal posterior surface. Postsurgical    artifact throughout the patellar tendon. .  No sign of abscess or sinus tract. Mild reactive    bone edema in the inferior pole of the patella. No [place] or [place]. Mild lateral patellar tilt without subluxation. Mild patellofemoral    chondromalacia. Small effusion. Tendinopathy at the medial gastrocnemius origin. Slit-like nondistended    unruptured Baker's cyst.  No loose body.   No acute macro fracture. CONCLUSION:   1. Worsening moderate hypertrophic patellar tendinopathy with residual interstitial tear,    slightly larger than prior, but no retracted tear. Mild reactive bone edema in the inferior    pole of the patella, no stress fracture. 2. Mild tricompartment chondromalacia. Similar to prior. 3. Chronic MCL sprain without tear. 4. Iliotibial band friction syndrome or strain without tear. I reviewed the images from his pre and postop MRIs as well as his pre and postop x-rays. We discussed surgical treatment at length. Right knee patella tendinitis after debridement. Plan: If we proceed with surgery is going to be in open patellar tendon debridement with suture anchor repair at the inferior pole the patella. He will be in a knee immobilizer with restricted motion for 4 weeks and on crutches during that time full recovery is on the order of 3 or more months. He has lots of concerns about surgery and is can go home and think about his options but right now we are planning on open patella tendon debridement on November 9. He understands the risk of recurrence, worsening symptoms, or other unforeseen complications including infection or anesthetic or neurovascular compromise. We discussed this with his wife as well and they all agree with this plan at this time.

## 2022-10-19 ENCOUNTER — TELEPHONE (OUTPATIENT)
Dept: ORTHOPEDIC SURGERY | Age: 47
End: 2022-10-19

## 2022-10-19 NOTE — TELEPHONE ENCOUNTER
CPT: [id number], [id number]  BODY PART: right knee  STATUS: outpatient  LOCATION: [facility]  AUTHORIZATION: [employer]    Submitted online with [facility] Case ID# [id number]    CPTs [id number] AND [id number] DO NOT REQUIRE PRIOR AUTHORIZATION AND PREDETERMINATION IS NOT RECOMMENDED.

## (undated) DEVICE — BLADE SHV L13CM DIA4MM TAPR TIP SCIS LIKE CUT OVL OUTER

## (undated) DEVICE — STERILE LATEX POWDER-FREE SURGICAL GLOVESWITH NITRILE COATING: Brand: PROTEXIS

## (undated) DEVICE — ZIMMER® STERILE DISPOSABLE TOURNIQUET CUFF WITH PLC, DUAL PORT, SINGLE BLADDER, 30 IN. (76 CM)

## (undated) DEVICE — STERILE POLYISOPRENE POWDER-FREE SURGICAL GLOVES: Brand: PROTEXIS

## (undated) DEVICE — ALCOHOL  RUBBING 70PERC ISOPROPYL  16-OZ

## (undated) DEVICE — IMMOBILIZER KNEE L20IN AD 1 SZ FIT MOST UNIV WRP ARND OPN

## (undated) DEVICE — SOLUTION IRRIG 3000ML LAC RINGERS ARTHROMTC PLAS CONT

## (undated) DEVICE — PROBE ABLAT 50DEG ASPIR MULTIPORT BPLR RF 1 PC ELECTRD ERGO

## (undated) DEVICE — KNEE ARTHROSCOPY: Brand: MEDLINE INDUSTRIES, INC.

## (undated) DEVICE — SUTURE NONABSORBABLE MONOFILAMENT 4-0 FS-2 18 IN ETHILON 662H

## (undated) DEVICE — APPLICATOR MEDICATED 26 CC SOLUTION HI LT ORNG CHLORAPREP

## (undated) DEVICE — GOWN,REINF,POLY,AURORA,XLNG/XL,STRL: Brand: MEDLINE

## (undated) DEVICE — SPONGE GZ W4XL4IN COT 12 PLY TYP VII WVN C FLD DSGN

## (undated) DEVICE — 3M™ STERI-DRAPE™ U-DRAPE 1015: Brand: STERI-DRAPE™

## (undated) DEVICE — STRIP,CLOSURE,WOUND,MEDI-STRIP,1/2X4: Brand: MEDLINE

## (undated) DEVICE — TUBING PMP L16FT MAIN DISP FOR AR-6400 AR-6475

## (undated) DEVICE — COVER LT HNDL BLU PLAS

## (undated) DEVICE — Z DISCONTINUED USE 2272117 DRAPE SURG 3 QTR N INVASIVE 2 LAYR DISP